# Patient Record
Sex: FEMALE | Race: WHITE | HISPANIC OR LATINO | URBAN - METROPOLITAN AREA
[De-identification: names, ages, dates, MRNs, and addresses within clinical notes are randomized per-mention and may not be internally consistent; named-entity substitution may affect disease eponyms.]

---

## 2020-06-05 ENCOUNTER — INPATIENT (INPATIENT)
Facility: HOSPITAL | Age: 50
LOS: 12 days | Discharge: HOME HEALTH | DRG: 949 | End: 2020-06-18
Attending: PHYSICAL MEDICINE & REHABILITATION | Admitting: PHYSICAL MEDICINE & REHABILITATION
Payer: COMMERCIAL

## 2020-06-05 VITALS
RESPIRATION RATE: 16 BRPM | HEART RATE: 107 BPM | DIASTOLIC BLOOD PRESSURE: 67 MMHG | TEMPERATURE: 98 F | WEIGHT: 118.17 LBS | OXYGEN SATURATION: 100 % | SYSTOLIC BLOOD PRESSURE: 103 MMHG | HEIGHT: 61 IN

## 2020-06-05 DIAGNOSIS — R53.81 OTHER MALAISE: ICD-10-CM

## 2020-06-05 PROCEDURE — 99222 1ST HOSP IP/OBS MODERATE 55: CPT

## 2020-06-05 RX ORDER — SENNA PLUS 8.6 MG/1
2 TABLET ORAL AT BEDTIME
Refills: 0 | Status: DISCONTINUED | OUTPATIENT
Start: 2020-06-05 | End: 2020-06-18

## 2020-06-05 RX ORDER — ACETAMINOPHEN 500 MG
650 TABLET ORAL EVERY 6 HOURS
Refills: 0 | Status: DISCONTINUED | OUTPATIENT
Start: 2020-06-05 | End: 2020-06-18

## 2020-06-05 RX ORDER — APIXABAN 2.5 MG/1
5 TABLET, FILM COATED ORAL
Refills: 0 | Status: DISCONTINUED | OUTPATIENT
Start: 2020-06-05 | End: 2020-06-09

## 2020-06-05 RX ORDER — SOD,AMMONIUM,POTASSIUM LACTATE
1 CREAM (GRAM) TOPICAL
Refills: 0 | Status: DISCONTINUED | OUTPATIENT
Start: 2020-06-05 | End: 2020-06-18

## 2020-06-05 RX ORDER — CALAMINE AND ZINC OXIDE AND PHENOL 160; 10 MG/ML; MG/ML
1 LOTION TOPICAL AT BEDTIME
Refills: 0 | Status: DISCONTINUED | OUTPATIENT
Start: 2020-06-05 | End: 2020-06-05

## 2020-06-05 RX ORDER — ZINC OXIDE 200 MG/G
1 OINTMENT TOPICAL
Refills: 0 | Status: DISCONTINUED | OUTPATIENT
Start: 2020-06-05 | End: 2020-06-05

## 2020-06-05 RX ORDER — ALBUTEROL 90 UG/1
1 AEROSOL, METERED ORAL EVERY 6 HOURS
Refills: 0 | Status: DISCONTINUED | OUTPATIENT
Start: 2020-06-05 | End: 2020-06-08

## 2020-06-05 RX ORDER — PANTOPRAZOLE SODIUM 20 MG/1
40 TABLET, DELAYED RELEASE ORAL
Refills: 0 | Status: DISCONTINUED | OUTPATIENT
Start: 2020-06-05 | End: 2020-06-09

## 2020-06-05 RX ORDER — CALCIUM CARBONATE 500(1250)
1 TABLET ORAL DAILY
Refills: 0 | Status: DISCONTINUED | OUTPATIENT
Start: 2020-06-05 | End: 2020-06-18

## 2020-06-05 RX ORDER — ATORVASTATIN CALCIUM 80 MG/1
20 TABLET, FILM COATED ORAL AT BEDTIME
Refills: 0 | Status: DISCONTINUED | OUTPATIENT
Start: 2020-06-05 | End: 2020-06-18

## 2020-06-05 RX ORDER — COLLAGENASE CLOSTRIDIUM HIST. 250 UNIT/G
1 OINTMENT (GRAM) TOPICAL
Refills: 0 | Status: DISCONTINUED | OUTPATIENT
Start: 2020-06-05 | End: 2020-06-08

## 2020-06-05 NOTE — H&P ADULT - NSHPSOCIALHISTORY_GEN_ALL_CORE
Social History  Denies smoking history, alcohol use or other drug use    Functional History:  Independent prior to COVID admission in ADLs and functional mobility  Lives with daughter in 4th floor walk-up apartment; has RW at home, daughter was providing wound care    Current Functional Status:  with PT on 6/4  Sit/stand Close S with RW  Bed/Chair CG w RW  Ambulation: 20' x 4 with RW and CG

## 2020-06-05 NOTE — H&P ADULT - NSHPPHYSICALEXAM_GEN_ALL_CORE
ICU Vital Signs Last 24 Hrs  T(C): --  T(F): --  HR: --  BP: --  BP(mean): --  ABP: --  ABP(mean): --  RR: --  SpO2: --    ----------------------------------------------------------------------------------------  PHYSICAL EXAM  Constitutional - NAD, Comfortable  HEENT - NCAT, EOMI  Neck - Supple, No limited ROM  Chest - Breathing comfortably, No wheezing  Cardiovascular - S1S2   Abdomen - Soft   Extremities - No C/C/E, No calf tenderness   Neurologic Exam -                    Cognitive - Awake, Alert, AAO to self, place, date, year, situation     Communication - Fluent, No dysarthria     Cranial Nerves - CN 2-12 intact     Motor - No focal deficits                    LEFT    UE - ShAB 5/5, EF 5/5, EE 5/5, WE 5/5,  5/5                    RIGHT UE - ShAB 5/5, EF 5/5, EE 5/5, WE 5/5,  5/5                    LEFT    LE - HF 5/5, KE 5/5, DF 5/5, PF 5/5                    RIGHT LE - HF 5/5, KE 5/5, DF 5/5, PF 5/5        Sensory - Intact to LT     Reflexes - DTR Intact, No primitive reflexive     Coordination - FTN intact     OculoVestibular - No saccades, No nystagmus, VOR         Balance - WNL Static  Psychiatric - Mood stable, Affect WNL Vital Signs Last 24 Hrs  T(C): 36.9 (05 Jun 2020 23:08), Max: 36.9 (05 Jun 2020 23:08)  T(F): 98.5 (05 Jun 2020 23:08), Max: 98.5 (05 Jun 2020 23:08)  HR: 107 (05 Jun 2020 23:08) (107 - 107)  BP: 103/67 (05 Jun 2020 23:08) (103/67 - 103/67)  BP(mean): --  RR: 16 (05 Jun 2020 23:08) (16 - 16)  SpO2: 100% (05 Jun 2020 23:08) (100% - 100%)  ----------------------------------------------------------------------------------------  PHYSICAL EXAM  Constitutional - NAD, Comfortable  HEENT - NCAT, EOMI  Neck - Supple, No limited ROM  Chest - Breathing comfortably, No wheezing  Cardiovascular - S1S2   Abdomen - Soft; +tender to palpation upper abdomen  Extremities - No C/C/E, No calf tenderness; dry skin  Neurologic Exam -                    Cognitive - Awake, Alert, AAO to self, place, date, year, situation     Communication - Fluent, No dysarthria     Cranial Nerves - CN 2-12 intact     Motor - No focal deficits                    LEFT    UE - ShAB 5/5, EF 4/5, EE 5/5, WE 5/5,  4/5                    RIGHT UE - ShAB 5/5, EF 4/5, EE 5/5, WE 5/5,  4/5                    LEFT    LE - HF 5/5, KE 5/5, DF 5/5, PF 5/5                    RIGHT LE - HF 5/5, KE 5/5, DF 5/5, PF 5/5        Sensory - Intact to LT     Coordination - FTN intact and coordinated movements of fingers intact bilaterally     OculoVestibular - no nystagmus  Psychiatric - Mood stable, Affect WNL  Skin - Left buttock 1x 0.5 cm pale wound bed with serosanguinous drainage;           Right buttock 7x2 cm full thickness wound; minimal slough Vital Signs Last 24 Hrs  T(C): 36.9 (05 Jun 2020 23:08), Max: 36.9 (05 Jun 2020 23:08)  T(F): 98.5 (05 Jun 2020 23:08), Max: 98.5 (05 Jun 2020 23:08)  HR: 107 (05 Jun 2020 23:08) (107 - 107)  BP: 103/67 (05 Jun 2020 23:08) (103/67 - 103/67)  BP(mean): --  RR: 16 (05 Jun 2020 23:08) (16 - 16)  SpO2: 100% (05 Jun 2020 23:08) (100% - 100%)  ----------------------------------------------------------------------------------------  PHYSICAL EXAM  Constitutional - NAD, Comfortable  HEENT - NCAT, EOMI  Neck - Supple, No limited ROM  Chest - Breathing comfortably, No wheezing  Cardiovascular - S1S2   Abdomen - Soft; +tender to palpation upper abdomen  Extremities - No C/C/E, No calf tenderness; dry skin  Neurologic Exam -                    Cognitive - Awake, Alert, AAO to self, place, date, year, situation     Communication - Fluent, No dysarthria     Cranial Nerves - CN 2-12 intact     Motor - No focal deficits                    LEFT    UE - ShAB 5/5, EF 4/5, EE 5/5, WE 5/5,  4/5                    RIGHT UE - ShAB 5/5, EF 4/5, EE 5/5, WE 5/5,  4/5                    LEFT    LE - HF 5/5, KE 5/5, DF 5/5, PF 5/5                    RIGHT LE - HF 5/5, KE 5/5, DF 5/5, PF 5/5        Sensory - Intact to LT     Coordination - FTN intact and coordinated movements of fingers intact bilaterally     OculoVestibular - no nystagmus  Psychiatric - Mood stable, Affect WNL  Skin - there is a full thickness wound to the sacral wound measuring 6x4cm with undermining from 12 to 4. Depth of about 2.5cm. Pale pink tissue to wound base. Some slough noted to wound base about 50%. No palpable bone. Periwound intact. No erythema. No odor noted.   No wounds or skin breakdown noted to heels, elbows, back, shoulders. No rashes noted.

## 2020-06-05 NOTE — H&P ADULT - HISTORY OF PRESENT ILLNESS
AYLIN PAREDES is a 50 year old woman with no significant PMH who had recent admission for COVID respiratory failure s/p intubation and trach to vent, was on ventilator for 30 days and discharged to [facility] on 5/11/20 and then went home after one day of [facility] on Eliquis 5mg bid. Patient then presented to [facility] on 5/25/20 with chest tightness and left arm numbness, fevers, nausea, abdominal discomfort, and urinary discomfort. Pulmonary embolus ruled out with CT chest. Patient also with stage 4 buttocks pressure injury. Gi consulted for dysphagia/pain with swallowing that GI attributed to recent decannulation, placed on puree/soft diet. Tachycardia during admission attributed to possible poor fluid intake. Pleuritic chest pain noted to improve. Tested COVID negative on 5/25 AYLIN PAREDES is a 50 year old woman with no significant PMH who had recent admission for COVID respiratory failure s/p intubation and trach to vent, was on ventilator for 30 days and discharged to [facility] on 5/11/20 and then went home after one day of [facility] on Eliquis 5mg bid. Patient then presented to [facility] on 5/25/20 with chest tightness and left arm numbness, fevers, nausea, abdominal discomfort, and urinary discomfort. Patient had chest tightness, tachycardia, elevated troponin likely secondary to pleurisy from recent COVID infection. ACS, myocarditis/pericarditis unlikely, seen by Cardiology. Pulmonary embolus ruled out with CT chest. Patient also with stage 4 buttocks pressure injury. Gi consulted for dysphagia/pain with swallowing that GI attributed to recent decannulation, placed on puree/soft diet. Tachycardia during admission attributed to possible poor fluid intake. Pleuritic chest pain noted to improve. Completed course of ceftriaxone for UTI. Tested COVID negative on 5/25

## 2020-06-05 NOTE — H&P ADULT - NSHPLABSRESULTS_GEN_ALL_CORE
6/5/20  Hgb/Hct:  7.8/24.4  WBC: 12.25  Plt: 650    Na/K: 135/3.7  BUN/Cr: 5.3/0.68      TTE 5/26  EF 60-65%    CT Chest 5/26: No definite evidence of pulmonary embolic disease; nonspecific bilateral groundglass and reticulonodular opacities and areas of consolidation which could be seen in atypical viral infection

## 2020-06-05 NOTE — H&P ADULT - ASSESSMENT
ASSESSMENT/PLAN  50/F with no significant PMH hospitalized with COVID s/p intubation, trach and decannulation with Gait Instability, ADL impairments and Functional impairments.    COMORBIDITES/ACTIVE MEDICAL ISSUES   #Post-COVID debility  -start Comprehensive Rehab Program of PT/OT/SLP  -Eliquis 5mg bid for post COVID ppx  -albuterol inhaler prn     #HLD?  -c/w statin    Pain Mgmt - Tylenol PRN  GI/Bowel Mgmt -  Continent c/w Senna prn; dulcolax supp prn; c/w pantoprazole  /Bladder Mgmt - Continent, PVR    FEN   - Diet - Puree/Soft   - Dysphagia  SLP - evaluation and treatment    Precautions / PROPHYLAXIS:   - Falls, Cardiac, Sternal, Spinal, Seizure   - ortho: Weight bearing status: WBAT   - Lungs: Aspiration, Incentive Spirometer   - Pressure injury/Skin: Turn Q2hrs while in bed, OOB to Chair, PT/OT    - DVT: Lovenox, SCDs, TEDs     MEDICAL PROGNOSIS: GOOD            REHAB POTENTIAL: GOOD             ESTIMATED DISPOSITION: HOME WITH HOME CARE            ELOS: 10-14 Days   EXPECTED THERAPY:     P.T. 1hr/day       O.T. 1hr/day      S.L.P. 1hr/day     P&O Unnecessary     EXP FREQUENCY: 5 days per 7 day period     PRESCREEN COMPARISION:   I have reviewed the prescreen information and I have found no relevant changes between the preadmission screening and my post admission evaluation     RATIONALE FOR INPATIENT ADMISSION - Patient demonstrates the following: (check all that apply)  [X] Medically appropriate for rehabilitation admission  [X] Has attainable rehab goals with an appropriate initial discharge plan  [X] Has rehabilitation potential (expected to make a significant improvement within a reasonable period of time)   [X] Requires close medical management by a rehab physician, rehab nursing care, Hospitalist and comprehensive interdisciplinary team (including PT, OT, & or SLP, Prosthetics and Orthotics) ASSESSMENT/PLAN  50/F with no significant PMH hospitalized with COVID s/p intubation, trach and decannulation with Gait Instability, ADL impairments and Functional impairments.    COMORBIDITES/ACTIVE MEDICAL ISSUES   #Post-COVID debility  -start Comprehensive Rehab Program of PT/OT/SLP  -Eliquis 5mg bid for post COVID ppx  -albuterol inhaler prn     #HLD?  -c/w statin    #Pressure Ulcers- Buttocks  -Left buttock stage 2- barrier cream and allevyn  -Right buttock stage 4- aquacell packing and allevyn dressing  -Wound care consult    Pain Mgmt - Tylenol PRN  GI/Bowel Mgmt -  Continent c/w Senna prn; dulcolax supp prn; c/w pantoprazole  /Bladder Mgmt - Continent, PVR    FEN   - Diet - Puree/Soft   - Dysphagia  SLP - evaluation and treatment    Precautions / PROPHYLAXIS:   - Falls, Cardiac, Sternal, Spinal, Seizure   - ortho: Weight bearing status: WBAT   - Lungs: Aspiration, Incentive Spirometer   - Pressure injury/Skin: Turn Q2hrs while in bed, OOB to Chair, PT/OT    - DVT: Lovenox, SCDs, TEDs     MEDICAL PROGNOSIS: GOOD            REHAB POTENTIAL: GOOD             ESTIMATED DISPOSITION: HOME WITH HOME CARE            ELOS: 10-14 Days   EXPECTED THERAPY:     P.T. 1hr/day       O.T. 1hr/day      S.L.P. 1hr/day     P&O Unnecessary     EXP FREQUENCY: 5 days per 7 day period     PRESCREEN COMPARISION:   I have reviewed the prescreen information and I have found no relevant changes between the preadmission screening and my post admission evaluation     RATIONALE FOR INPATIENT ADMISSION - Patient demonstrates the following: (check all that apply)  [X] Medically appropriate for rehabilitation admission  [X] Has attainable rehab goals with an appropriate initial discharge plan  [X] Has rehabilitation potential (expected to make a significant improvement within a reasonable period of time)   [X] Requires close medical management by a rehab physician, rehab nursing care, Hospitalist and comprehensive interdisciplinary team (including PT, OT, & or SLP, Prosthetics and Orthotics) ASSESSMENT/PLAN  50/F with no significant PMH hospitalized with COVID s/p intubation, trach and decannulation with Gait Instability, ADL impairments and Functional impairments.    COMORBIDITES/ACTIVE MEDICAL ISSUES   #Post-COVID debility  -start Comprehensive Rehab Program of PT/OT/SLP  -Eliquis 5mg bid for post COVID ppx  -albuterol inhaler prn     #HLD?  -c/w statin    Healing stage 4 pressure ulcer to sacral region   Per chart, wound is staged as 4.   Will start BID dressing changes, wound gel to base and wet to dry dressing, loosely packed. Monitor signs of healing, may benefit from WVAC.     Add MVI, vit c BID, and zinc x 9 days (limit use of zinc to avoid copper deficiency).  Roho cushion while in wc.   Turn and reposition at minimum of q2hrs to offload pressure.   Continue sol and ensure.        Pain Mgmt - Tylenol PRN  GI/Bowel Mgmt -  Continent c/w Senna prn; dulcolax supp prn; c/w pantoprazole  /Bladder Mgmt - Continent, PVR    FEN   - Diet - Puree/Soft   - Dysphagia  SLP - evaluation and treatment    Precautions / PROPHYLAXIS:   - Falls, Cardiac, Sternal, Spinal, Seizure   - ortho: Weight bearing status: WBAT   - Lungs: Aspiration, Incentive Spirometer   - Pressure injury/Skin: Turn Q2hrs while in bed, OOB to Chair, PT/OT    - DVT: Lovenox, SCDs, TEDs     MEDICAL PROGNOSIS: GOOD            REHAB POTENTIAL: GOOD             ESTIMATED DISPOSITION: HOME WITH HOME CARE            ELOS: 10-14 Days   EXPECTED THERAPY:     P.T. 1hr/day       O.T. 1hr/day      S.L.P. 1hr/day     P&O Unnecessary     EXP FREQUENCY: 5 days per 7 day period     PRESCREEN COMPARISION:   I have reviewed the prescreen information and I have found no relevant changes between the preadmission screening and my post admission evaluation     RATIONALE FOR INPATIENT ADMISSION - Patient demonstrates the following: (check all that apply)  [X] Medically appropriate for rehabilitation admission  [X] Has attainable rehab goals with an appropriate initial discharge plan  [X] Has rehabilitation potential (expected to make a significant improvement within a reasonable period of time)   [X] Requires close medical management by a rehab physician, rehab nursing care, Hospitalist and comprehensive interdisciplinary team (including PT, OT, & or SLP, Prosthetics and Orthotics)

## 2020-06-05 NOTE — H&P ADULT - NSHPREVIEWOFSYSTEMS_GEN_ALL_CORE
REVIEW OF SYSTEMS  Constitutional: No fever, No Chills, No fatigue  HEENT: No eye pain, No visual disturbances, No difficulty hearing  Pulm: No cough,  No shortness of breath  Cardio: No chest pain, No palpitations  GI:  No abdominal pain, No nausea, No vomiting, No diarrhea, No constipation  : No dysuria, No frequency, No hematuria  Neuro: No headaches, No memory loss, No loss of strength, No numbness, No tremors  Skin: No itching, No rashes, No lesions   Endo: No temperature intolerance  MSK: No joint pain, No joint swelling, No muscle pain, No Neck or back pain  Psych:  No depression, No anxiety REVIEW OF SYSTEMS  Constitutional: No fever, No Chills, No fatigue  HEENT: No eye pain, No visual disturbances, No difficulty hearing  Pulm: + cough with movement,  +dyspnea with exertion  Cardio: No chest pain, No palpitations  GI:  +Gas frequent, + heartburn, +abdominal pain, No nausea, No vomiting, No diarrhea, No constipation  : No dysuria, No frequency, No hematuria  Neuro: No headaches, No memory loss, No loss of strength, No numbness, No tremors  Skin: No itching, No rashes, + lesions ; +dry skin  Endo: No temperature intolerance  MSK: No joint pain, No joint swelling, No muscle pain, No Neck or back pain  Psych:  No depression, No anxiety

## 2020-06-06 LAB
ALBUMIN SERPL ELPH-MCNC: 2.9 G/DL — LOW (ref 3.3–5)
ALP SERPL-CCNC: 99 U/L — SIGNIFICANT CHANGE UP (ref 40–120)
ALT FLD-CCNC: 24 U/L — SIGNIFICANT CHANGE UP (ref 10–45)
ANION GAP SERPL CALC-SCNC: 9 MMOL/L — SIGNIFICANT CHANGE UP (ref 5–17)
AST SERPL-CCNC: 17 U/L — SIGNIFICANT CHANGE UP (ref 10–40)
BASOPHILS # BLD AUTO: 0.08 K/UL — SIGNIFICANT CHANGE UP (ref 0–0.2)
BASOPHILS NFR BLD AUTO: 0.7 % — SIGNIFICANT CHANGE UP (ref 0–2)
BILIRUB SERPL-MCNC: 0.6 MG/DL — SIGNIFICANT CHANGE UP (ref 0.2–1.2)
BUN SERPL-MCNC: 5 MG/DL — LOW (ref 7–23)
CALCIUM SERPL-MCNC: 8.6 MG/DL — SIGNIFICANT CHANGE UP (ref 8.4–10.5)
CHLORIDE SERPL-SCNC: 101 MMOL/L — SIGNIFICANT CHANGE UP (ref 96–108)
CO2 SERPL-SCNC: 27 MMOL/L — SIGNIFICANT CHANGE UP (ref 22–31)
CREAT SERPL-MCNC: 0.82 MG/DL — SIGNIFICANT CHANGE UP (ref 0.5–1.3)
CRP SERPL-MCNC: 0.12 MG/DL — SIGNIFICANT CHANGE UP (ref 0–0.4)
D DIMER BLD IA.RAPID-MCNC: <150 NG/ML DDU — SIGNIFICANT CHANGE UP
EOSINOPHIL # BLD AUTO: 0.18 K/UL — SIGNIFICANT CHANGE UP (ref 0–0.5)
EOSINOPHIL NFR BLD AUTO: 1.5 % — SIGNIFICANT CHANGE UP (ref 0–6)
FERRITIN SERPL-MCNC: 1246 NG/ML — HIGH (ref 15–150)
GLUCOSE SERPL-MCNC: 97 MG/DL — SIGNIFICANT CHANGE UP (ref 70–99)
HCT VFR BLD CALC: 25 % — LOW (ref 34.5–45)
HGB BLD-MCNC: 8.3 G/DL — LOW (ref 11.5–15.5)
IMM GRANULOCYTES NFR BLD AUTO: 1.9 % — HIGH (ref 0–1.5)
LYMPHOCYTES # BLD AUTO: 5.94 K/UL — HIGH (ref 1–3.3)
LYMPHOCYTES # BLD AUTO: 50.4 % — HIGH (ref 13–44)
MCHC RBC-ENTMCNC: 30.9 PG — SIGNIFICANT CHANGE UP (ref 27–34)
MCHC RBC-ENTMCNC: 33.2 GM/DL — SIGNIFICANT CHANGE UP (ref 32–36)
MCV RBC AUTO: 92.9 FL — SIGNIFICANT CHANGE UP (ref 80–100)
MONOCYTES # BLD AUTO: 0.67 K/UL — SIGNIFICANT CHANGE UP (ref 0–0.9)
MONOCYTES NFR BLD AUTO: 5.7 % — SIGNIFICANT CHANGE UP (ref 2–14)
NEUTROPHILS # BLD AUTO: 4.69 K/UL — SIGNIFICANT CHANGE UP (ref 1.8–7.4)
NEUTROPHILS NFR BLD AUTO: 39.8 % — LOW (ref 43–77)
NRBC # BLD: 0 /100 WBCS — SIGNIFICANT CHANGE UP (ref 0–0)
PLATELET # BLD AUTO: 616 K/UL — HIGH (ref 150–400)
POTASSIUM SERPL-MCNC: 3.5 MMOL/L — SIGNIFICANT CHANGE UP (ref 3.5–5.3)
POTASSIUM SERPL-SCNC: 3.5 MMOL/L — SIGNIFICANT CHANGE UP (ref 3.5–5.3)
PROT SERPL-MCNC: 6.3 G/DL — SIGNIFICANT CHANGE UP (ref 6–8.3)
RBC # BLD: 2.69 M/UL — LOW (ref 3.8–5.2)
RBC # FLD: 15.9 % — HIGH (ref 10.3–14.5)
SODIUM SERPL-SCNC: 137 MMOL/L — SIGNIFICANT CHANGE UP (ref 135–145)
WBC # BLD: 11.78 K/UL — HIGH (ref 3.8–10.5)
WBC # FLD AUTO: 11.78 K/UL — HIGH (ref 3.8–10.5)

## 2020-06-06 PROCEDURE — 99254 IP/OBS CNSLTJ NEW/EST MOD 60: CPT

## 2020-06-06 PROCEDURE — 93010 ELECTROCARDIOGRAM REPORT: CPT

## 2020-06-06 PROCEDURE — 99232 SBSQ HOSP IP/OBS MODERATE 35: CPT

## 2020-06-06 RX ORDER — SIMETHICONE 80 MG/1
80 TABLET, CHEWABLE ORAL THREE TIMES A DAY
Refills: 0 | Status: DISCONTINUED | OUTPATIENT
Start: 2020-06-06 | End: 2020-06-07

## 2020-06-06 RX ORDER — SODIUM CHLORIDE 9 MG/ML
1000 INJECTION INTRAMUSCULAR; INTRAVENOUS; SUBCUTANEOUS
Refills: 0 | Status: COMPLETED | OUTPATIENT
Start: 2020-06-06 | End: 2020-06-06

## 2020-06-06 RX ADMIN — SODIUM CHLORIDE 75 MILLILITER(S): 9 INJECTION INTRAMUSCULAR; INTRAVENOUS; SUBCUTANEOUS at 13:20

## 2020-06-06 RX ADMIN — ATORVASTATIN CALCIUM 20 MILLIGRAM(S): 80 TABLET, FILM COATED ORAL at 21:13

## 2020-06-06 RX ADMIN — PANTOPRAZOLE SODIUM 40 MILLIGRAM(S): 20 TABLET, DELAYED RELEASE ORAL at 05:31

## 2020-06-06 RX ADMIN — ALBUTEROL 1 PUFF(S): 90 AEROSOL, METERED ORAL at 16:07

## 2020-06-06 RX ADMIN — APIXABAN 5 MILLIGRAM(S): 2.5 TABLET, FILM COATED ORAL at 05:31

## 2020-06-06 RX ADMIN — ALBUTEROL 1 PUFF(S): 90 AEROSOL, METERED ORAL at 09:47

## 2020-06-06 RX ADMIN — APIXABAN 5 MILLIGRAM(S): 2.5 TABLET, FILM COATED ORAL at 17:41

## 2020-06-06 RX ADMIN — ALBUTEROL 1 PUFF(S): 90 AEROSOL, METERED ORAL at 22:08

## 2020-06-06 RX ADMIN — Medication 1 APPLICATION(S): at 17:08

## 2020-06-06 RX ADMIN — Medication 1 TABLET(S): at 13:20

## 2020-06-06 RX ADMIN — Medication 1 APPLICATION(S): at 05:31

## 2020-06-06 NOTE — CHART NOTE - NSCHARTNOTEFT_GEN_A_CORE
Upon Nutritional Assessment by the Registered Dietitian Your Patient was Determined to Meet criteria/has Evidence of the Following Diagnosis:          [X]  Acute Severe Protein-Calorie Malnutrition    Findings as based on:  [X] Comprehensive Nutrition Assessment   [X] Nutrition Focused Physical Exam - Temporalis, Orbital Fat Pads, Buccal Fat Pads, Clavicle, Quadricep & Gastrocnemius(Calf) Wasting/Depletion Observed  [X] Other: Poor PO Intake 5+ Days & Significant Weight Decrease Over Last Month    Nutrition Plan/Recommendations:    1) Ensure Clear 8oz PO BID (Provides 480kcal & 16grams of Protein)  2) Darrion 1 Packet BID (Provides 180kcal & 28grams of Protein)     PROVIDER Section:   By Signing This Assessment You Are Acknowledging & Agree with The Diagnosis Assigned by the Registered Dietitian    Clarence Bañuelos RDN

## 2020-06-06 NOTE — DIETITIAN INITIAL EVALUATION ADULT. - SIGNS/SYMPTOMS
Poor PO Intake, Significant Weight Decrease & Fat Depletion/Muscle Wasting Observed Need for Soft (Dysphagia 3) Diet

## 2020-06-06 NOTE — DIETITIAN INITIAL EVALUATION ADULT. - ENERGY NEEDS
Height: 61Inches   Weight: 118lb   Body Mass Index (BMI): 22.3kg/m2   Ideal Body Weight Range: 105lb (+/-10%)   Percent Ideal Body Weight ~112%

## 2020-06-06 NOTE — DIETITIAN INITIAL EVALUATION ADULT. - ADD RECOMMEND
1) Monitor Weights, Intake, Tolerance, Skin & Labwork   2) Ensure Clear 8oz PO BID 3) Darrion 1 Packet BID 4) Education Provided on Need for Supplementation 5) Continue Nutrition Plan of Care

## 2020-06-06 NOTE — DIETITIAN INITIAL EVALUATION ADULT. - DIET TYPE
on Soft (Dysphagia 3) Diet w/ Thin Liquids   Recommend Initiate Ensure Clear 8oz PO BID (Declines Ensure Enlive) & Darrion 1 Packet BID  Education Provided on Need for Supplementation   Patient Does Not Follow Diet @Home, Consumes 3Meals a Day & Doesn't Take Vitamin/Supplements @Home dysphagia 3, soft, thin liquids/supplement (specify)

## 2020-06-06 NOTE — PROGRESS NOTE ADULT - SUBJECTIVE AND OBJECTIVE BOX
Patient is a 50y old  Female who presents with a chief complaint of COVID Debility (06 Jun 2020 11:09)      HPI:  AYLIN PAREDES is a 50 year old woman with no significant PMH who had recent admission for COVID respiratory failure s/p intubation and trach to vent, was on ventilator for 30 days and discharged to [facility] on 5/11/20 and then went home after one day of [facility] on Eliquis 5mg bid. Patient then presented to [facility] on 5/25/20 with chest tightness and left arm numbness, fevers, nausea, abdominal discomfort, and urinary discomfort. Patient had chest tightness, tachycardia, elevated troponin likely secondary to pleurisy from recent COVID infection. ACS, myocarditis/pericarditis unlikely, seen by Cardiology. Pulmonary embolus ruled out with CT chest. Patient also with stage 4 buttocks pressure injury. Gi consulted for dysphagia/pain with swallowing that GI attributed to recent decannulation, placed on puree/soft diet. Tachycardia during admission attributed to possible poor fluid intake. Pleuritic chest pain noted to improve. Completed course of ceftriaxone for UTI. Tested COVID negative on 5/25 (05 Jun 2020 15:41)      TODAY'S SUBJECTIVE & REVIEW OF SYMPTOMS: Patient seen and evaluated this morning. No acute events overnight. Participating well in therapy. Pain is well controlled. Denies chest pain, SOB, nausea, vomiting, constipation or diarrhea. Slept well last night.       [X] Constiutional WNL        [X] Cardio WNL              [X] Resp WNL  [X] GI WNL                            [X]  WNL                    [X] Heme WNL  [X] Endo WNL                       [X] Skin WNL                  [X] MSK WNL  [X] Neuro WNL                     [X] Cognitive WNL         [X] Psych WNL      PHYSICAL EXAM    Vital Signs Last 24 Hrs  T(C): 36.8 (06 Jun 2020 09:03), Max: 36.9 (05 Jun 2020 23:08)  T(F): 98.2 (06 Jun 2020 09:03), Max: 98.5 (05 Jun 2020 23:08)  HR: 104 (06 Jun 2020 16:08) (98 - 127)  BP: 71/47 (06 Jun 2020 10:01) (71/47 - 103/67)  BP(mean): --  RR: 18 (06 Jun 2020 09:03) (16 - 18)  SpO2: 97% (06 Jun 2020 16:08) (97% - 100%)    Constitutional - NAD, Comfortable  	HEENT - NCAT, EOMI  	Neck - Supple, No limited ROM  	Chest - Breathing comfortably, No wheezing  	Cardiovascular - S1S2   	Abdomen - Soft; +tender to palpation upper abdomen  	Extremities - No C/C/E, No calf tenderness; dry skin  	Neurologic Exam -                 	   Cognitive - Awake, Alert, AAO to self, place, date, year, situation  	   Communication - Fluent, No dysarthria  	   Cranial Nerves - CN 2-12 intact  	   Motor - No focal deficits  	                  LEFT    UE - ShAB 5/5, EF 4/5, EE 5/5, WE 5/5,  4/5  	                  RIGHT UE - ShAB 5/5, EF 4/5, EE 5/5, WE 5/5,  4/5  	                  LEFT    LE - HF 5/5, KE 5/5, DF 5/5, PF 5/5  	                  RIGHT LE - HF 5/5, KE 5/5, DF 5/5, PF 5/5     	   Sensory - Intact to LT  	   Coordination - FTN intact and coordinated movements of fingers intact bilaterally  	   OculoVestibular - no nystagmus  	Psychiatric - Mood stable, Affect WNL  	Skin - Left buttock 1x 0.5 cm pale wound bed with serosanguinous drainage;           Right buttock 7x2 cm full thickness wound; minimal slough  RECENT LABS/IMAGING                        8.3    11.78 )-----------( 616      ( 06 Jun 2020 07:30 )             25.0     06-06    137  |  101  |  5<L>  ----------------------------<  97  3.5   |  27  |  0.82    Ca    8.6      06 Jun 2020 07:30    TPro  6.3  /  Alb  2.9<L>  /  TBili  0.6  /  DBili  x   /  AST  17  /  ALT  24  /  AlkPhos  99  06-06     ASSESSMENT/PLAN  50/F with no significant PMH hospitalized with COVID s/p intubation, trach and decannulation with Gait Instability, ADL impairments and Functional impairments.    COMORBIDITES/ACTIVE MEDICAL ISSUES   #Post-COVID debility  -start Comprehensive Rehab Program of PT/OT/SLP  -Eliquis 5mg bid for post COVID ppx  -albuterol inhaler prn     #HLD?  -c/w statin    #Pressure Ulcers- Buttocks  -Left buttock stage 2- barrier cream and allevyn  -Right buttock stage 4- aquacell packing and allevyn dressing  -Wound care consult    Pain Mgmt - Tylenol PRN  GI/Bowel Mgmt -  Continent c/w Senna prn; dulcolax supp prn; c/w pantoprazole  /Bladder Mgmt - Continent, PVR    FEN   - Diet - Puree/Soft   - Dysphagia  SLP - evaluation and treatment    Precautions / PROPHYLAXIS:   - Falls, Cardiac, Sternal, Spinal, Seizure   - ortho: Weight bearing status: WBAT   - Lungs: Aspiration, Incentive Spirometer   - Pressure injury/Skin: Turn Q2hrs while in bed, OOB to Chair, PT/OT    - DVT: Lovenox, SCDs, TEDs

## 2020-06-06 NOTE — PROGRESS NOTE ADULT - ASSESSMENT
51 y/o woman with no significant PMH hospitalized with COVID s/p intubation, trach and decannulation, readmitted for chest tightness, and possibly pleuritis now admiited to [facility] for gait Instability, ADL impairments and Functional impairments.    Rheab: Functional and gait instability:  - C/w PT/OT per primary team .    CVS: Chest tightness, SOB, Tachycardia, HLD, pleuritis due to COVID infection  - Was seen by cardiology and likely pleuritis in setting of normal TTE  - Orthostatic? will give her gentle fluid hydration   - Pleuritis likley related to COVID infection  - will EKG and TTE here   - C/w Toprol 20 mg PO QD   - C/w Eliquis 5 g PO BID for now; given D-dimer it can be switched to Xarelto 10 mg PO QD   - In initial hospitalization she was Intubated, trach and then decannulated due to COVID infection     Pressure Ulcers- Buttocks  -Left buttock stage 2- barrier cream and allevyn  -Right buttock stage 4- aquacell packing and allevyn dressing  -Wound care consult    GI: Dysphagia   - C/w Protonix 40 mg PO QD     DVT PPX:   - C/w Eliquis 5 mg PO BID for now

## 2020-06-06 NOTE — DIETITIAN INITIAL EVALUATION ADULT. - OTHER INFO
50yr Old Female - Dx: Malaise - Initial Assessment - Diet - Soft (Dysphagia 3) Diet w/ Thin Liquids (Recommend Initiate Ensure Clear 8oz PO BID & Darrion 1 Packet BID), Denies Food Allergy/Intolerance (Prefers Lactaid Milk), Tolerates Diet Well, No Chewing/Swallowing Complications (Per Patient), Stage 4 Pressure Ulcer on Right Buttock & Stage 2 Pressure Ulcer on Left Buttock  (as Per Nursing Flow Sheets), No Edema Noted (as Per Nursing Flow Sheets), No Recent Vomiting/Diarrhea & Some Nausea/Constipation (as Per Patient)

## 2020-06-06 NOTE — PROGRESS NOTE ADULT - SUBJECTIVE AND OBJECTIVE BOX
Patient is a 50y old  Female who presents with a chief complaint of COVID Debility (05 Jun 2020 15:41)    HPI:  51 y/o woman with no significant PMH who had recent admission for COVID respiratory failure s/p intubation and trach to vent, was on ventilator for 30 days and discharged to [facility] on 5/11/20 and then went home after one day of [facility] on Eliquis 5mg bid. Patient then presented to [facility] on 5/25/20 with chest tightness and left arm numbness, fevers, nausea, abdominal discomfort, and urinary discomfort. Patient had chest tightness, tachycardia, elevated troponin likely secondary to pleurisy from recent COVID infection. ACS, myocarditis/pericarditis unlikely, seen by Cardiology. Pulmonary embolus ruled out with CT chest. Patient also with stage 4 buttocks pressure injury. Gi consulted for dysphagia/pain with swallowing that GI attributed to recent decannulation, placed on puree/soft diet. Tachycardia during admission attributed to possible poor fluid intake. Pleuritic chest pain noted to improve. Completed course of ceftriaxone for UTI. Tested COVID negative on 5/25 (05 Jun 2020 15:41)    PAST MEDICAL & SURGICAL HISTORY:  No pertinent past medical history  No significant past surgical history    Substance Use (street drugs): (  ) never used  (  ) other:  Tobacco Usage:  (   ) never smoked   (   ) former smoker   (   ) current smoker  (     ) pack year  Alcohol Usage:  Sexual History:   Recent Travel:      Allergies    No Known Allergies    Intolerances        ALBUTerol    90 MICROgram(s) HFA Inhaler 1 Puff(s) Inhalation every 6 hours  aluminum hydroxide/magnesium hydroxide/simethicone Suspension 30 milliLiter(s) Oral every 6 hours PRN  ammonium lactate 12% Lotion 1 Application(s) Topical two times a day  apixaban 5 milliGRAM(s) Oral two times a day  atorvastatin 20 milliGRAM(s) Oral at bedtime  calcium carbonate   1250 mG (OsCal) 1 Tablet(s) Oral daily  collagenase Ointment 1 Application(s) Topical two times a day  pantoprazole    Tablet 40 milliGRAM(s) Oral before breakfast      REVIEW OF SYSTEMS:  CONSTITUTIONAL: No fever, weight loss, or fatigue  EYES: No eye pain, visual disturbances, or discharge  ENMT:  No difficulty hearing, tinnitus, vertigo; No sinus or throat pain  NECK: No pain or stiffness  BREASTS: No pain, masses, or nipple discharge  RESPIRATORY: No cough, wheezing, chills or hemoptysis; No shortness of breath  CARDIOVASCULAR: No chest pain, palpitations, dizziness, or leg swelling  GASTROINTESTINAL: No abdominal or epigastric pain. No nausea, vomiting, or hematemesis; No diarrhea or constipation. No melena or hematochezia.  GENITOURINARY: No dysuria, frequency, hematuria, or incontinence  NEUROLOGICAL: No headaches, memory loss, loss of strength, numbness, or tremors  SKIN: No itching, burning, rashes, or lesions   LYMPH NODES: No enlarged glands  ENDOCRINE: No heat or cold intolerance; No hair loss  MUSCULOSKELETAL: No joint pain or swelling; No muscle, back, or extremity pain  PSYCHIATRIC: No depression, anxiety, mood swings, or difficulty sleeping  HEME/LYMPH: No easy bruising, or bleeding gums  ALLERY AND IMMUNOLOGIC: No hives or eczema    ALL ROS REVIEWED AND NORMAL EXCEPT AS STATED ABOVE    T(C): 36.8 (06-06-20 @ 09:03), Max: 36.9 (06-05-20 @ 23:08)  HR: 104 (06-06-20 @ 09:48) (101 - 127)  BP: 90/62 (06-06-20 @ 09:03) (90/62 - 103/67)  RR: 18 (06-06-20 @ 09:03) (16 - 18)  SpO2: 98% (06-06-20 @ 09:48) (98% - 100%)  Wt(kg): --Vital Signs Last 24 Hrs  T(C): 36.8 (06 Jun 2020 09:03), Max: 36.9 (05 Jun 2020 23:08)  T(F): 98.2 (06 Jun 2020 09:03), Max: 98.5 (05 Jun 2020 23:08)  HR: 104 (06 Jun 2020 09:48) (101 - 127)  BP: 90/62 (06 Jun 2020 09:03) (90/62 - 103/67)  BP(mean): --  RR: 18 (06 Jun 2020 09:03) (16 - 18)  SpO2: 98% (06 Jun 2020 09:48) (98% - 100%)    PHYSICAL EXAM:  Constitutional - NAD, Comfortable  HEENT - NCAT, EOMI  Neck - Supple, No limited ROM  Chest - Breathing comfortably, No wheezing  Cardiovascular - S1S2   Abdomen - Soft; +tender to palpation upper abdomen  Extremities - No C/C/E, No calf tenderness; dry skin    LABS:                        8.3    11.78 )-----------( 616      ( 06 Jun 2020 07:30 )             25.0     06-06    137  |  101  |  5<L>  ----------------------------<  97  3.5   |  27  |  0.82    Ca    8.6      06 Jun 2020 07:30    TPro  6.3  /  Alb  2.9<L>  /  TBili  0.6  /  DBili  x   /  AST  17  /  ALT  24  /  AlkPhos  99  06-06      RADIOLOGY & ADDITIONAL TESTS:    Consultant(s) Notes Reviewed:  [x ] YES  [ ] NO  Care Discussed with Consultants/Other Providers [ x] YES  [ ] NO  Imaging Personally Reviewed:  [ ] YES  [ ] NO This is a consult note    Patient is a 50y old  Female who presents with a chief complaint of COVID Debility (05 Jun 2020 15:41)    HPI:  49 y/o woman with no significant PMH who had recent admission for COVID respiratory failure s/p intubation and trach to vent, was on ventilator for 30 days and discharged to [facility] on 5/11/20 and then went home after one day of [facility] on Eliquis 5mg bid. Patient then presented to [facility] on 5/25/20 with chest tightness and left arm numbness, fevers, nausea, abdominal discomfort, and urinary discomfort. Patient had chest tightness, tachycardia, elevated troponin likely secondary to pleurisy from recent COVID infection. ACS, myocarditis/pericarditis unlikely, seen by Cardiology. Pulmonary embolus ruled out with CT chest. Patient also with stage 4 buttocks pressure injury. Gi consulted for dysphagia/pain with swallowing that GI attributed to recent decannulation, placed on puree/soft diet. Tachycardia during admission attributed to possible poor fluid intake. Pleuritic chest pain noted to improve. Completed course of ceftriaxone for UTI. Tested COVID negative on 5/25 (05 Jun 2020 15:41)    PAST MEDICAL & SURGICAL HISTORY:  No pertinent past medical history  No significant past surgical history    Substance Use (street drugs): (  ) never used  (  ) other:  Tobacco Usage:  (   ) never smoked   (   ) former smoker   (   ) current smoker  (     ) pack year  Alcohol Usage:  Sexual History:   Recent Travel:      Allergies    No Known Allergies    Intolerances        ALBUTerol    90 MICROgram(s) HFA Inhaler 1 Puff(s) Inhalation every 6 hours  aluminum hydroxide/magnesium hydroxide/simethicone Suspension 30 milliLiter(s) Oral every 6 hours PRN  ammonium lactate 12% Lotion 1 Application(s) Topical two times a day  apixaban 5 milliGRAM(s) Oral two times a day  atorvastatin 20 milliGRAM(s) Oral at bedtime  calcium carbonate   1250 mG (OsCal) 1 Tablet(s) Oral daily  collagenase Ointment 1 Application(s) Topical two times a day  pantoprazole    Tablet 40 milliGRAM(s) Oral before breakfast      REVIEW OF SYSTEMS:  CONSTITUTIONAL: No fever, weight loss, or fatigue  EYES: No eye pain, visual disturbances, or discharge  ENMT:  No difficulty hearing, tinnitus, vertigo; No sinus or throat pain  NECK: No pain or stiffness  BREASTS: No pain, masses, or nipple discharge  RESPIRATORY: No cough, wheezing, chills or hemoptysis; No shortness of breath  CARDIOVASCULAR: No chest pain, palpitations, dizziness, or leg swelling  GASTROINTESTINAL: No abdominal or epigastric pain. No nausea, vomiting, or hematemesis; No diarrhea or constipation. No melena or hematochezia.  GENITOURINARY: No dysuria, frequency, hematuria, or incontinence  NEUROLOGICAL: No headaches, memory loss, loss of strength, numbness, or tremors  SKIN: No itching, burning, rashes, or lesions   LYMPH NODES: No enlarged glands  ENDOCRINE: No heat or cold intolerance; No hair loss  MUSCULOSKELETAL: No joint pain or swelling; No muscle, back, or extremity pain  PSYCHIATRIC: No depression, anxiety, mood swings, or difficulty sleeping  HEME/LYMPH: No easy bruising, or bleeding gums  ALLERY AND IMMUNOLOGIC: No hives or eczema    ALL ROS REVIEWED AND NORMAL EXCEPT AS STATED ABOVE    T(C): 36.8 (06-06-20 @ 09:03), Max: 36.9 (06-05-20 @ 23:08)  HR: 104 (06-06-20 @ 09:48) (101 - 127)  BP: 90/62 (06-06-20 @ 09:03) (90/62 - 103/67)  RR: 18 (06-06-20 @ 09:03) (16 - 18)  SpO2: 98% (06-06-20 @ 09:48) (98% - 100%)  Wt(kg): --Vital Signs Last 24 Hrs  T(C): 36.8 (06 Jun 2020 09:03), Max: 36.9 (05 Jun 2020 23:08)  T(F): 98.2 (06 Jun 2020 09:03), Max: 98.5 (05 Jun 2020 23:08)  HR: 104 (06 Jun 2020 09:48) (101 - 127)  BP: 90/62 (06 Jun 2020 09:03) (90/62 - 103/67)  BP(mean): --  RR: 18 (06 Jun 2020 09:03) (16 - 18)  SpO2: 98% (06 Jun 2020 09:48) (98% - 100%)    PHYSICAL EXAM:  Constitutional - NAD, Comfortable  HEENT - NCAT, EOMI  Neck - Supple, No limited ROM  Chest - Breathing comfortably, No wheezing  Cardiovascular - S1S2   Abdomen - Soft; +tender to palpation upper abdomen  Extremities - No C/C/E, No calf tenderness; dry skin    LABS:                        8.3    11.78 )-----------( 616      ( 06 Jun 2020 07:30 )             25.0     06-06    137  |  101  |  5<L>  ----------------------------<  97  3.5   |  27  |  0.82    Ca    8.6      06 Jun 2020 07:30    TPro  6.3  /  Alb  2.9<L>  /  TBili  0.6  /  DBili  x   /  AST  17  /  ALT  24  /  AlkPhos  99  06-06      RADIOLOGY & ADDITIONAL TESTS:    Consultant(s) Notes Reviewed:  [x ] YES  [ ] NO  Care Discussed with Consultants/Other Providers [ x] YES  [ ] NO  Imaging Personally Reviewed:  [ ] YES  [ ] NO

## 2020-06-07 PROCEDURE — 99233 SBSQ HOSP IP/OBS HIGH 50: CPT

## 2020-06-07 PROCEDURE — 93307 TTE W/O DOPPLER COMPLETE: CPT | Mod: 26

## 2020-06-07 PROCEDURE — 99232 SBSQ HOSP IP/OBS MODERATE 35: CPT

## 2020-06-07 RX ORDER — SIMETHICONE 80 MG/1
80 TABLET, CHEWABLE ORAL THREE TIMES A DAY
Refills: 0 | Status: DISCONTINUED | OUTPATIENT
Start: 2020-06-07 | End: 2020-06-08

## 2020-06-07 RX ADMIN — APIXABAN 5 MILLIGRAM(S): 2.5 TABLET, FILM COATED ORAL at 06:26

## 2020-06-07 RX ADMIN — Medication 1 APPLICATION(S): at 08:41

## 2020-06-07 RX ADMIN — Medication 1 APPLICATION(S): at 06:25

## 2020-06-07 RX ADMIN — Medication 1 APPLICATION(S): at 16:39

## 2020-06-07 RX ADMIN — SIMETHICONE 80 MILLIGRAM(S): 80 TABLET, CHEWABLE ORAL at 21:20

## 2020-06-07 RX ADMIN — APIXABAN 5 MILLIGRAM(S): 2.5 TABLET, FILM COATED ORAL at 16:38

## 2020-06-07 RX ADMIN — ALBUTEROL 1 PUFF(S): 90 AEROSOL, METERED ORAL at 15:46

## 2020-06-07 RX ADMIN — Medication 1 TABLET(S): at 12:09

## 2020-06-07 RX ADMIN — ATORVASTATIN CALCIUM 20 MILLIGRAM(S): 80 TABLET, FILM COATED ORAL at 21:20

## 2020-06-07 RX ADMIN — ALBUTEROL 1 PUFF(S): 90 AEROSOL, METERED ORAL at 05:17

## 2020-06-07 RX ADMIN — ALBUTEROL 1 PUFF(S): 90 AEROSOL, METERED ORAL at 08:30

## 2020-06-07 RX ADMIN — ALBUTEROL 1 PUFF(S): 90 AEROSOL, METERED ORAL at 21:14

## 2020-06-07 RX ADMIN — PANTOPRAZOLE SODIUM 40 MILLIGRAM(S): 20 TABLET, DELAYED RELEASE ORAL at 06:26

## 2020-06-07 NOTE — PROGRESS NOTE ADULT - SUBJECTIVE AND OBJECTIVE BOX
Hospitalist: Johnny Carpenter DO    CHIEF COMPLAINT: Patient is a 50y old  female who presents with a chief complaint of COVID Debility (06 Jun 2020 20:10)    SUBJECTIVE / OVERNIGHT EVENTS: Patient seen and examined. No acute events overnight. Pain well controlled and patient without any complaints.    MEDICATIONS  (STANDING):  ALBUTerol    90 MICROgram(s) HFA Inhaler 1 Puff(s) Inhalation every 6 hours  ammonium lactate 12% Lotion 1 Application(s) Topical two times a day  apixaban 5 milliGRAM(s) Oral two times a day  atorvastatin 20 milliGRAM(s) Oral at bedtime  calcium carbonate   1250 mG (OsCal) 1 Tablet(s) Oral daily  collagenase Ointment 1 Application(s) Topical two times a day  pantoprazole    Tablet 40 milliGRAM(s) Oral before breakfast    MEDICATIONS  (PRN):  acetaminophen   Tablet .. 650 milliGRAM(s) Oral every 6 hours PRN Temp greater or equal to 38C (100.4F), Mild Pain (1 - 3)  aluminum hydroxide/magnesium hydroxide/simethicone Suspension 30 milliLiter(s) Oral every 6 hours PRN Dyspepsia  senna 2 Tablet(s) Oral at bedtime PRN Constipation  simethicone 80 milliGRAM(s) Chew three times a day PRN Indigestion      VITALS:  T(F): 98.8 (06-07-20 @ 07:37), Max: 98.8 (06-07-20 @ 07:37)  HR: 110 (06-07-20 @ 07:37) (98 - 110)  BP: 94/58 (06-07-20 @ 07:37) (86/60 - 94/66)  RR: 16 (06-07-20 @ 07:37) (16 - 16)  SpO2: 100% (06-07-20 @ 07:37)    Weight (kg): 52.5 (07:37)    REVIEW OF SYSTEMS:  For ROV please refer back to H&P     PHYSICAL EXAM:  Constitutional - NAD, Comfortable  HEENT - NCAT, EOMI  Neck - Supple, No limited ROM  Chest - Breathing comfortably, No wheezing  Cardiovascular - S1S2   Abdomen - Soft; +tender to palpation upper abdomen  Extremities - No C/C/E, No calf tenderness; dry skin      LABS:              8.3                  137  | 27   | 5            11.78 >-----------< 616     ------------------------< 97                    25.0                 3.5  | 101  | 0.82                                         Ca 8.6   Mg x     Ph x           TPro  6.3  /  Alb  2.9      TBili  0.6  /  DBili  x         AST  17  /  ALT  24            AlkPhos  99        RADIOLOGY & ADDITIONAL TESTS:    Imaging Personally Reviewed:    [X] Consultant(s) Notes Reviewed:  [X] Care Discussed with Consultants/Other Providers:

## 2020-06-07 NOTE — PROGRESS NOTE ADULT - ASSESSMENT
51 y/o woman with no significant PMH hospitalized with COVID s/p intubation, trach and decannulation, readmitted for chest tightness, and possibly pleuritis now admiited to [facility] for gait Instability, ADL impairments and Functional impairments.    Rheab: Functional and gait instability:  - C/w PT/OT per primary team .    CVS: Chest tightness, SOB, Tachycardia, HLD, pleuritis due to COVID infection  - Was seen by cardiology and likely pleuritis in setting of normal TTE | Orthostatic? will give her gentle fluid hydration   - Pleuritis possibly related to COVID infection or anxiety related, | F/u Neuro Psych consult   - will EKG and TTE here   - C/w Toprol 20 mg PO QD   - C/w Eliquis 5 g PO BID for now; given D-dimer it can be switched to Xarelto 10 mg PO QD   - In initial hospitalization she was Intubated, trach and then decannulated due to COVID infection     Pressure Ulcers- Buttocks  -Left buttock stage 2- barrier cream and allevyn  -Right buttock stage 4- aquacell packing and allevyn dressing  -Wound care consult    GI: Dysphagia   - C/w Protonix 40 mg PO QD     DVT PPX:   - C/w Eliquis 5 mg PO BID for now 51 y/o woman with no significant PMH hospitalized with COVID s/p intubation, trach and decannulation, readmitted for chest tightness, and possibly pleuritis now admiited to [facility] for gait Instability, ADL impairments and Functional impairments.    Rheab: Functional and gait instability:  - C/w PT/OT per primary team .    CVS: Chest tightness, SOB, Tachycardia, HLD, pleuritis due to COVID infection  - Was seen by cardiology and likely pleuritis in setting of normal TTE | Orthostatic? will give her gentle fluid hydration   - Pleuritis possibly related to COVID infection or anxiety related, | F/u Neuro Psych consult   - EKG shows Sinus Tachycardia; F/u TTE and COVID antibody testing  - C/w Toprol 20 mg PO QD   - C/w Eliquis 5 g PO BID for now; given D-dimer it can be switched to Xarelto 10 mg PO QD   - In initial hospitalization she was Intubated, trach and then decannulated due to COVID infection     Pressure Ulcers- Buttocks  -Left buttock stage 2- barrier cream and allevyn  -Right buttock stage 4- aquacell packing and allevyn dressing  -Wound care consult    GI: Dysphagia   - C/w Protonix 40 mg PO QD     DVT PPX:   - C/w Eliquis 5 mg PO BID for now

## 2020-06-07 NOTE — PROGRESS NOTE ADULT - SUBJECTIVE AND OBJECTIVE BOX
Patient is a 50y old  Female who presents with a chief complaint of COVID Debility (06 Jun 2020 11:09)      HPI:  AYLIN PAREDES is a 50 year old woman with no significant PMH who had recent admission for COVID respiratory failure s/p intubation and trach to vent, was on ventilator for 30 days and discharged to [facility] on 5/11/20 and then went home after one day of [facility] on Eliquis 5mg bid. Patient then presented to [facility] on 5/25/20 with chest tightness and left arm numbness, fevers, nausea, abdominal discomfort, and urinary discomfort. Patient had chest tightness, tachycardia, elevated troponin likely secondary to pleurisy from recent COVID infection. ACS, myocarditis/pericarditis unlikely, seen by Cardiology. Pulmonary embolus ruled out with CT chest. Patient also with stage 4 buttocks pressure injury. Gi consulted for dysphagia/pain with swallowing that GI attributed to recent decannulation, placed on puree/soft diet. Tachycardia during admission attributed to possible poor fluid intake. Pleuritic chest pain noted to improve. Completed course of ceftriaxone for UTI. Tested COVID negative on 5/25 (05 Jun 2020 15:41)      TODAY'S SUBJECTIVE & REVIEW OF SYMPTOMS: Patient seen and evaluated this morning. No acute events overnight. Participating well in therapy. Pain is well controlled. Denies chest pain, SOB, nausea, vomiting, constipation or diarrhea. Slept well last night. EKG and echocardiogram results discussed. Simethicone ordered for gas.       [X] Constiutional WNL        [X] Cardio WNL              [X] Resp WNL  [X] GI WNL                            [X]  WNL                    [X] Heme WNL  [X] Endo WNL                       [X] Skin WNL                  [X] MSK WNL  [X] Neuro WNL                     [X] Cognitive WNL         [X] Psych WNL      PHYSICAL EXAM    Vital Signs Last 24 Hrs   Vital Signs Last 24 Hrs  T(C): 37.1 (07 Jun 2020 07:37), Max: 37.1 (07 Jun 2020 07:37)  T(F): 98.8 (07 Jun 2020 07:37), Max: 98.8 (07 Jun 2020 07:37)  HR: 78 (07 Jun 2020 15:46) (78 - 110)  BP: 94/58 (07 Jun 2020 07:37) (86/60 - 94/66)  BP(mean): --  RR: 16 (07 Jun 2020 07:37) (16 - 16)  SpO2: 98% (07 Jun 2020 15:46) (96% - 100%)    Constitutional - NAD, Comfortable  	HEENT - NCAT, EOMI  	Neck - Supple, No limited ROM  	Chest - Breathing comfortably, No wheezing  	Cardiovascular - S1S2   	Abdomen - Soft; +tender to palpation upper abdomen  	Extremities - No C/C/E, No calf tenderness; dry skin  	Neurologic Exam -                 	   Cognitive - Awake, Alert, AAO to self, place, date, year, situation  	   Communication - Fluent, No dysarthria  	   Cranial Nerves - CN 2-12 intact  	   Motor - No focal deficits  	                  LEFT    UE - ShAB 5/5, EF 4/5, EE 5/5, WE 5/5,  4/5  	                  RIGHT UE - ShAB 5/5, EF 4/5, EE 5/5, WE 5/5,  4/5  	                  LEFT    LE - HF 5/5, KE 5/5, DF 5/5, PF 5/5  	                  RIGHT LE - HF 5/5, KE 5/5, DF 5/5, PF 5/5     	   Sensory - Intact to LT  	   Coordination - FTN intact and coordinated movements of fingers intact bilaterally  	   OculoVestibular - no nystagmus  	Psychiatric - Mood stable, Affect WNL  	Skin - Left buttock 1x 0.5 cm pale wound bed with serosanguinous drainage;                               8.3    11.78 )-----------( 616      ( 06 Jun 2020 07:30 )             25.0     06-06    137  |  101  |  5<L>  ----------------------------<  97  3.5   |  27  |  0.82    Ca    8.6      06 Jun 2020 07:30    TPro  6.3  /  Alb  2.9<L>  /  TBili  0.6  /  DBili  x   /  AST  17  /  ALT  24  /  AlkPhos  99  06-06          Right buttock 7x2 cm full thickness wound; minimal slough  RECENT LABS/IMAGING                         ASSESSMENT/PLAN  50/F with no significant PMH hospitalized with COVID s/p intubation, trach and decannulation with Gait Instability, ADL impairments and Functional impairments.    COMORBIDITES/ACTIVE MEDICAL ISSUES   #Post-COVID debility  -start Comprehensive Rehab Program of PT/OT/SLP  -Eliquis 5mg bid for post COVID ppx  -albuterol inhaler prn     #HLD?  -c/w statin    #Pressure Ulcers- Buttocks  -Left buttock stage 2- barrier cream and allevyn  -Right buttock stage 4- aquacell packing and allevyn dressing  -Wound care consult    Pain Mgmt - Tylenol PRN  GI/Bowel Mgmt -  Continent c/w Senna prn; dulcolax supp prn; c/w pantoprazole  /Bladder Mgmt - Continent, PVR    FEN   - Diet - Puree/Soft   - Dysphagia  SLP - evaluation and treatment    Precautions / PROPHYLAXIS:   - Falls, Cardiac, Sternal, Spinal, Seizure   - ortho: Weight bearing status: WBAT   - Lungs: Aspiration, Incentive Spirometer   - Pressure injury/Skin: Turn Q2hrs while in bed, OOB to Chair, PT/OT    - DVT: Lovenox, SCDs, TEDs

## 2020-06-08 LAB
ALBUMIN SERPL ELPH-MCNC: 2.7 G/DL — LOW (ref 3.3–5)
ALP SERPL-CCNC: 88 U/L — SIGNIFICANT CHANGE UP (ref 40–120)
ALT FLD-CCNC: 21 U/L — SIGNIFICANT CHANGE UP (ref 10–45)
ANION GAP SERPL CALC-SCNC: 8 MMOL/L — SIGNIFICANT CHANGE UP (ref 5–17)
AST SERPL-CCNC: 14 U/L — SIGNIFICANT CHANGE UP (ref 10–40)
BASOPHILS # BLD AUTO: 0.07 K/UL — SIGNIFICANT CHANGE UP (ref 0–0.2)
BASOPHILS NFR BLD AUTO: 0.6 % — SIGNIFICANT CHANGE UP (ref 0–2)
BILIRUB SERPL-MCNC: 0.4 MG/DL — SIGNIFICANT CHANGE UP (ref 0.2–1.2)
BUN SERPL-MCNC: 13 MG/DL — SIGNIFICANT CHANGE UP (ref 7–23)
CALCIUM SERPL-MCNC: 8.2 MG/DL — LOW (ref 8.4–10.5)
CHLORIDE SERPL-SCNC: 104 MMOL/L — SIGNIFICANT CHANGE UP (ref 96–108)
CO2 SERPL-SCNC: 28 MMOL/L — SIGNIFICANT CHANGE UP (ref 22–31)
CREAT SERPL-MCNC: 0.96 MG/DL — SIGNIFICANT CHANGE UP (ref 0.5–1.3)
EOSINOPHIL # BLD AUTO: 0.69 K/UL — HIGH (ref 0–0.5)
EOSINOPHIL NFR BLD AUTO: 5.8 % — SIGNIFICANT CHANGE UP (ref 0–6)
GLUCOSE SERPL-MCNC: 116 MG/DL — HIGH (ref 70–99)
HCT VFR BLD CALC: 22.8 % — LOW (ref 34.5–45)
HGB BLD-MCNC: 7.4 G/DL — LOW (ref 11.5–15.5)
IMM GRANULOCYTES NFR BLD AUTO: 1.4 % — SIGNIFICANT CHANGE UP (ref 0–1.5)
IRON SATN MFR SERPL: 18 % — SIGNIFICANT CHANGE UP (ref 14–50)
IRON SATN MFR SERPL: 39 UG/DL — SIGNIFICANT CHANGE UP (ref 30–160)
LYMPHOCYTES # BLD AUTO: 48.3 % — HIGH (ref 13–44)
LYMPHOCYTES # BLD AUTO: 5.78 K/UL — HIGH (ref 1–3.3)
MCHC RBC-ENTMCNC: 30.6 PG — SIGNIFICANT CHANGE UP (ref 27–34)
MCHC RBC-ENTMCNC: 32.5 GM/DL — SIGNIFICANT CHANGE UP (ref 32–36)
MCV RBC AUTO: 94.2 FL — SIGNIFICANT CHANGE UP (ref 80–100)
MONOCYTES # BLD AUTO: 0.74 K/UL — SIGNIFICANT CHANGE UP (ref 0–0.9)
MONOCYTES NFR BLD AUTO: 6.2 % — SIGNIFICANT CHANGE UP (ref 2–14)
NEUTROPHILS # BLD AUTO: 4.51 K/UL — SIGNIFICANT CHANGE UP (ref 1.8–7.4)
NEUTROPHILS NFR BLD AUTO: 37.7 % — LOW (ref 43–77)
NRBC # BLD: 0 /100 WBCS — SIGNIFICANT CHANGE UP (ref 0–0)
PLATELET # BLD AUTO: 561 K/UL — HIGH (ref 150–400)
POTASSIUM SERPL-MCNC: 3.3 MMOL/L — LOW (ref 3.5–5.3)
POTASSIUM SERPL-SCNC: 3.3 MMOL/L — LOW (ref 3.5–5.3)
PROT SERPL-MCNC: 5.9 G/DL — LOW (ref 6–8.3)
RBC # BLD: 2.42 M/UL — LOW (ref 3.8–5.2)
RBC # FLD: 15.6 % — HIGH (ref 10.3–14.5)
SARS-COV-2 IGG SERPL QL IA: POSITIVE
SARS-COV-2 IGM SERPL IA-ACNC: 3.14 INDEX — HIGH
SARS-COV-2 RNA SPEC QL NAA+PROBE: SIGNIFICANT CHANGE UP
SODIUM SERPL-SCNC: 140 MMOL/L — SIGNIFICANT CHANGE UP (ref 135–145)
TIBC SERPL-MCNC: 214 UG/DL — LOW (ref 220–430)
TRANSFERRIN SERPL-MCNC: 160 MG/DL — LOW (ref 200–360)
TSH SERPL-MCNC: 7.48 UIU/ML — HIGH (ref 0.27–4.2)
TSH SERPL-MCNC: 7.6 UIU/ML — HIGH (ref 0.27–4.2)
UIBC SERPL-MCNC: 174 UG/DL — SIGNIFICANT CHANGE UP (ref 110–370)
WBC # BLD: 11.96 K/UL — HIGH (ref 3.8–10.5)
WBC # FLD AUTO: 11.96 K/UL — HIGH (ref 3.8–10.5)

## 2020-06-08 PROCEDURE — 93010 ELECTROCARDIOGRAM REPORT: CPT

## 2020-06-08 PROCEDURE — 99233 SBSQ HOSP IP/OBS HIGH 50: CPT

## 2020-06-08 PROCEDURE — 99232 SBSQ HOSP IP/OBS MODERATE 35: CPT

## 2020-06-08 PROCEDURE — 99223 1ST HOSP IP/OBS HIGH 75: CPT

## 2020-06-08 RX ORDER — SODIUM CHLORIDE 9 MG/ML
500 INJECTION INTRAMUSCULAR; INTRAVENOUS; SUBCUTANEOUS ONCE
Refills: 0 | Status: COMPLETED | OUTPATIENT
Start: 2020-06-08 | End: 2020-06-08

## 2020-06-08 RX ORDER — SODIUM CHLORIDE 9 MG/ML
500 INJECTION INTRAMUSCULAR; INTRAVENOUS; SUBCUTANEOUS
Refills: 0 | Status: DISCONTINUED | OUTPATIENT
Start: 2020-06-08 | End: 2020-06-18

## 2020-06-08 RX ORDER — ASCORBIC ACID 60 MG
500 TABLET,CHEWABLE ORAL
Refills: 0 | Status: DISCONTINUED | OUTPATIENT
Start: 2020-06-08 | End: 2020-06-18

## 2020-06-08 RX ORDER — ZINC SULFATE TAB 220 MG (50 MG ZINC EQUIVALENT) 220 (50 ZN) MG
220 TAB ORAL DAILY
Refills: 0 | Status: COMPLETED | OUTPATIENT
Start: 2020-06-08 | End: 2020-06-16

## 2020-06-08 RX ORDER — POTASSIUM CHLORIDE 20 MEQ
20 PACKET (EA) ORAL
Refills: 0 | Status: COMPLETED | OUTPATIENT
Start: 2020-06-08 | End: 2020-06-08

## 2020-06-08 RX ORDER — FERROUS SULFATE 325(65) MG
325 TABLET ORAL DAILY
Refills: 0 | Status: DISCONTINUED | OUTPATIENT
Start: 2020-06-08 | End: 2020-06-18

## 2020-06-08 RX ADMIN — ALBUTEROL 1 PUFF(S): 90 AEROSOL, METERED ORAL at 04:43

## 2020-06-08 RX ADMIN — ALBUTEROL 1 PUFF(S): 90 AEROSOL, METERED ORAL at 09:17

## 2020-06-08 RX ADMIN — APIXABAN 5 MILLIGRAM(S): 2.5 TABLET, FILM COATED ORAL at 17:59

## 2020-06-08 RX ADMIN — Medication 1 TABLET(S): at 13:17

## 2020-06-08 RX ADMIN — ATORVASTATIN CALCIUM 20 MILLIGRAM(S): 80 TABLET, FILM COATED ORAL at 21:08

## 2020-06-08 RX ADMIN — APIXABAN 5 MILLIGRAM(S): 2.5 TABLET, FILM COATED ORAL at 05:16

## 2020-06-08 RX ADMIN — Medication 20 MILLIEQUIVALENT(S): at 17:58

## 2020-06-08 RX ADMIN — Medication 650 MILLIGRAM(S): at 00:34

## 2020-06-08 RX ADMIN — Medication 1 APPLICATION(S): at 19:53

## 2020-06-08 RX ADMIN — Medication 1 APPLICATION(S): at 05:15

## 2020-06-08 RX ADMIN — SIMETHICONE 80 MILLIGRAM(S): 80 TABLET, CHEWABLE ORAL at 13:17

## 2020-06-08 RX ADMIN — PANTOPRAZOLE SODIUM 40 MILLIGRAM(S): 20 TABLET, DELAYED RELEASE ORAL at 05:48

## 2020-06-08 RX ADMIN — ZINC SULFATE TAB 220 MG (50 MG ZINC EQUIVALENT) 220 MILLIGRAM(S): 220 (50 ZN) TAB at 13:17

## 2020-06-08 RX ADMIN — SIMETHICONE 80 MILLIGRAM(S): 80 TABLET, CHEWABLE ORAL at 05:38

## 2020-06-08 RX ADMIN — SODIUM CHLORIDE 500 MILLILITER(S): 9 INJECTION INTRAMUSCULAR; INTRAVENOUS; SUBCUTANEOUS at 13:57

## 2020-06-08 RX ADMIN — SODIUM CHLORIDE 500 MILLILITER(S): 9 INJECTION INTRAMUSCULAR; INTRAVENOUS; SUBCUTANEOUS at 11:52

## 2020-06-08 RX ADMIN — Medication 20 MILLIEQUIVALENT(S): at 19:50

## 2020-06-08 RX ADMIN — Medication 650 MILLIGRAM(S): at 19:50

## 2020-06-08 NOTE — PROGRESS NOTE ADULT - SUBJECTIVE AND OBJECTIVE BOX
Patient is a 50y old  Female who presents with a chief complaint of COVID Debility (08 Jun 2020 08:57)      Patient seen and examined at bedside early this morning. She complains of feeling nervous this morning. Also complains of low blood pressure when she stands. Denied chest pain, shortness of breath, nausea, vomiting, diarrhea. Later in the morning, she had an episode of hypotension and palpitations during physical therapy.    ALLERGIES:  No Known Allergies    MEDICATIONS  (STANDING):  ammonium lactate 12% Lotion 1 Application(s) Topical two times a day  apixaban 5 milliGRAM(s) Oral two times a day  ascorbic acid 500 milliGRAM(s) Oral two times a day  atorvastatin 20 milliGRAM(s) Oral at bedtime  calcium carbonate   1250 mG (OsCal) 1 Tablet(s) Oral daily  multivitamin 1 Tablet(s) Oral daily  pantoprazole    Tablet 40 milliGRAM(s) Oral before breakfast  simethicone 80 milliGRAM(s) Chew three times a day  sodium chloride 0.9% Bolus 500 milliLiter(s) IV Bolus once  zinc sulfate 220 milliGRAM(s) Oral daily    MEDICATIONS  (PRN):  acetaminophen   Tablet .. 650 milliGRAM(s) Oral every 6 hours PRN Temp greater or equal to 38C (100.4F), Mild Pain (1 - 3)  aluminum hydroxide/magnesium hydroxide/simethicone Suspension 30 milliLiter(s) Oral every 6 hours PRN Dyspepsia  senna 2 Tablet(s) Oral at bedtime PRN Constipation    Vital Signs Last 24 Hrs  T(F): 98.5 (08 Jun 2020 08:56), Max: 98.5 (08 Jun 2020 08:56)  HR: 118 (08 Jun 2020 11:20) (78 - 133)  BP: 97/45 (08 Jun 2020 11:20) (71/54 - 100/44)  RR: 16 (08 Jun 2020 10:08) (16 - 16)  SpO2: 100% (08 Jun 2020 10:08) (98% - 100%)  I&O's Summary    BMI (kg/m2): 21.9 (06-07-20 @ 07:37)  PHYSICAL EXAM:  General: NAD, A/O x 3  ENT: MMM  Neck: Supple, No JVD  Lungs: Clear to auscultation bilaterally  Cardio: RRR, S1/S2, No murmurs  Abdomen: Soft, Nontender, Nondistended; Bowel sounds present  Extremities: No calf tenderness, No pitting edema    LABS:                        8.3    11.78 )-----------( 616      ( 06 Jun 2020 07:30 )             25.0       06-06    137  |  101  |  5   ----------------------------<  97  3.5   |  27  |  0.82    Ca    8.6      06 Jun 2020 07:30    TPro  6.3  /  Alb  2.9  /  TBili  0.6  /  DBili  x   /  AST  17  /  ALT  24  /  AlkPhos  99  06-06     eGFR if Non African American: 84 mL/min/1.73M2 (06-06-20 @ 07:30)  eGFR if : 97 mL/min/1.73M2 (06-06-20 @ 07:30)               TSH 7.48   TSH with FT4 reflex --  Total T3 --                        RADIOLOGY & ADDITIONAL TESTS:    Care Discussed with Consultants/Other Providers:

## 2020-06-08 NOTE — CONSULT NOTE ADULT - ASSESSMENT
Assessment:    FIM scores: Social Interaction ; Problem Solving ; Memory .  Plan: Individual supportive psychotherapy to monitor cognition, affect/mood, and behavior. Cognitive remediation during speech therapy sessions. Participation in recreation/art therapy in order to have pleasure and mastery experiences and regain/reestablish a sense of routine.

## 2020-06-08 NOTE — PROGRESS NOTE ADULT - SUBJECTIVE AND OBJECTIVE BOX
Patient is a 50y old  Female who presents with a chief complaint of COVID Debility (06 Jun 2020 11:09)      HPI:  AYLIN PAREDES is a 50 year old woman with no significant PMH who had recent admission for COVID respiratory failure s/p intubation and trach to vent, was on ventilator for 30 days and discharged to [facility] on 5/11/20 and then went home after one day of [facility] on Eliquis 5mg bid. Patient then presented to [facility] on 5/25/20 with chest tightness and left arm numbness, fevers, nausea, abdominal discomfort, and urinary discomfort. Patient had chest tightness, tachycardia, elevated troponin likely secondary to pleurisy from recent COVID infection. ACS, myocarditis/pericarditis unlikely, seen by Cardiology. Pulmonary embolus ruled out with CT chest. Patient also with stage 4 buttocks pressure injury. Gi consulted for dysphagia/pain with swallowing that GI attributed to recent decannulation, placed on puree/soft diet. Tachycardia during admission attributed to possible poor fluid intake. Pleuritic chest pain noted to improve. Completed course of ceftriaxone for UTI. Tested COVID negative on 5/25 (05 Jun 2020 15:41)      TODAY'S SUBJECTIVE & REVIEW OF SYMPTOMS: patient with HR of 130s this morning and SBP of 70-80 with lightheadedness. No chest pain, SOB, new weakness, nausea, vomiting, diarrhea. She reports drinking lots of water. Afebrile. Labs pending. BP improved with 500 cc bolus with SBP of 94.     [X] Constiutional WNL        [X] Cardio WNL              [X] Resp WNL  [X] GI WNL                            [X]  WNL                    [X] Heme WNL  [X] Endo WNL                       [X] Skin WNL                  [X] MSK WNL  [X] Neuro WNL                     [X] Cognitive WNL         [X] Psych WNL      PHYSICAL EXAM     Vital Signs Last 24 Hrs  T(C): 36.9 (08 Jun 2020 08:56), Max: 36.9 (08 Jun 2020 08:56)  T(F): 98.5 (08 Jun 2020 08:56), Max: 98.5 (08 Jun 2020 08:56)  HR: 118 (08 Jun 2020 11:20) (78 - 133)  BP: 97/45 (08 Jun 2020 11:20) (71/54 - 100/44)  BP(mean): --  RR: 16 (08 Jun 2020 10:08) (16 - 16)  SpO2: 100% (08 Jun 2020 10:08) (98% - 100%)    Constitutional - NAD, Comfortable  	HEENT - NCAT, EOMI  	Neck - Supple, No limited ROM  	Chest - Breathing comfortably, No wheezing  	Cardiovascular - S1S2   	Abdomen - Soft; +tender to palpation upper abdomen  	Extremities - No C/C/E, No calf tenderness; dry skin  	Neurologic Exam -                 	   Cognitive - Awake, Alert, AAO to self, place, date, year, situation  	   Communication - Fluent, No dysarthria  	   Cranial Nerves - CN 2-12 intact  	   Motor - No focal deficits  	                  LEFT    UE - ShAB 5/5, EF 4/5, EE 5/5, WE 5/5,  4/5  	                  RIGHT UE - ShAB 5/5, EF 4/5, EE 5/5, WE 5/5,  4/5  	                  LEFT    LE - HF 5/5, KE 5/5, DF 5/5, PF 5/5  	                  RIGHT LE - HF 5/5, KE 5/5, DF 5/5, PF 5/5     	   Sensory - Intact to LT  	   Coordination - FTN intact and coordinated movements of fingers intact bilaterally  	   OculoVestibular - no nystagmus  	Psychiatric - Mood stable, Affect WNL  	Skin - Left buttock 1x 0.5 cm pale wound bed with serosanguinous drainage;                             Right buttock 7x2 cm full thickness wound; minimal slough  RECENT LABS/IMAGING                         ASSESSMENT/PLAN  50/F with no significant PMH hospitalized with COVID s/p intubation, trach and decannulation with Gait Instability, ADL impairments and Functional impairments.    COMORBIDITES/ACTIVE MEDICAL ISSUES   #Post-COVID debility  -start Comprehensive Rehab Program of PT/OT/SLP  -Eliquis 5mg bid for post COVID ppx  -albuterol inhaler prn     #HLD?  -c/w statin    #Pressure Ulcers- Buttocks  -Left buttock stage 2- barrier cream and allevyn  -Right buttock stage 4- aquacell packing and allevyn dressing  -Wound care consult    Pain Mgmt - Tylenol PRN  GI/Bowel Mgmt -  Continent c/w Senna prn; dulcolax supp prn; c/w pantoprazole  /Bladder Mgmt - Continent, PVR    FEN   - Diet - Puree/Soft   - Dysphagia  SLP - evaluation and treatment    # Hypotensive, tachycardia: some improvement with IV bolus , SBP improved from 70 to 94. pending labs collection. Monitor Hb and electrolytes. encourage PO intake. EKG and echocardiogram reviewed. EF 50-60%.     Precautions / PROPHYLAXIS:   - Falls, Cardiac, Sternal, Spinal, Seizure   - ortho: Weight bearing status: WBAT   - Lungs: Aspiration, Incentive Spirometer   - Pressure injury/Skin: Turn Q2hrs while in bed, OOB to Chair, PT/OT    - DVT: Lovenox, SCDs, TEDs Patient is a 50y old  Female who presents with a chief complaint of COVID Debility (06 Jun 2020 11:09)      HPI:  AYLIN PAREDES is a 50 year old woman with no significant PMH who had recent admission for COVID respiratory failure s/p intubation and trach to vent, was on ventilator for 30 days and discharged to [facility] on 5/11/20 and then went home after one day of [facility] on Eliquis 5mg bid. Patient then presented to [facility] on 5/25/20 with chest tightness and left arm numbness, fevers, nausea, abdominal discomfort, and urinary discomfort. Patient had chest tightness, tachycardia, elevated troponin likely secondary to pleurisy from recent COVID infection. ACS, myocarditis/pericarditis unlikely, seen by Cardiology. Pulmonary embolus ruled out with CT chest. Patient also with stage 4 buttocks pressure injury. Gi consulted for dysphagia/pain with swallowing that GI attributed to recent decannulation, placed on puree/soft diet. Tachycardia during admission attributed to possible poor fluid intake. Pleuritic chest pain noted to improve. Completed course of ceftriaxone for UTI. Tested COVID negative on 5/25 (05 Jun 2020 15:41)      TODAY'S SUBJECTIVE & REVIEW OF SYMPTOMS: patient with HR of 130s this morning and SBP of 70-80 with lightheadedness. No chest pain, SOB, new weakness, nausea, vomiting, diarrhea. She reports drinking lots of water. Afebrile. Labs pending. BP improved with 500 cc bolus with SBP of 94.     [X] Constiutional WNL        [X] Cardio WNL              [X] Resp WNL  [X] GI WNL                            [X]  WNL                    [X] Heme WNL  [X] Endo WNL                       [X] Skin WNL                  [X] MSK WNL  [X] Neuro WNL                     [X] Cognitive WNL         [X] Psych WNL      PHYSICAL EXAM     Vital Signs Last 24 Hrs  T(C): 36.9 (08 Jun 2020 08:56), Max: 36.9 (08 Jun 2020 08:56)  T(F): 98.5 (08 Jun 2020 08:56), Max: 98.5 (08 Jun 2020 08:56)  HR: 118 (08 Jun 2020 11:20) (78 - 133)  BP: 97/45 (08 Jun 2020 11:20) (71/54 - 100/44)  BP(mean): --  RR: 16 (08 Jun 2020 10:08) (16 - 16)  SpO2: 100% (08 Jun 2020 10:08) (98% - 100%)    Constitutional - NAD, Comfortable  	HEENT - NCAT, EOMI  	Neck - Supple, No limited ROM  	Chest - Breathing comfortably, No wheezing  	Cardiovascular - S1S2   	Abdomen - Soft; +tender to palpation upper abdomen  	Extremities - No C/C/E, No calf tenderness; dry skin  	Neurologic Exam -                 	   Cognitive - Awake, Alert, AAO to self, place, date, year, situation  	   Communication - Fluent, No dysarthria  	   Cranial Nerves - CN 2-12 intact  	   Motor - No focal deficits  	                  LEFT    UE - ShAB 5/5, EF 4/5, EE 5/5, WE 5/5,  4/5  	                  RIGHT UE - ShAB 5/5, EF 4/5, EE 5/5, WE 5/5,  4/5  	                  LEFT    LE - HF 5/5, KE 5/5, DF 5/5, PF 5/5  	                  RIGHT LE - HF 5/5, KE 5/5, DF 5/5, PF 5/5     	   Sensory - Intact to LT  	   Coordination - FTN intact and coordinated movements of fingers intact bilaterally  	   OculoVestibular - no nystagmus  	Psychiatric - Mood stable, Affect WNL  		Skin - there is a full thickness wound to the sacral wound measuring 6x4cm with undermining from 12 to 4. Depth of about 2.5cm. Pale pink tissue to wound base. Some slough noted to wound base about 50%. No palpable bone. Periwound intact. No erythema. No odor noted.   No wounds or skin breakdown noted to heels, elbows, back, shoulders. No rashes noted.                                  Right buttock 7x2 cm full thickness wound; minimal slough  RECENT LABS/IMAGING                         ASSESSMENT/PLAN  50/F with no significant PMH hospitalized with COVID s/p intubation, trach and decannulation with Gait Instability, ADL impairments and Functional impairments.    COMORBIDITES/ACTIVE MEDICAL ISSUES   #Post-COVID debility  -start Comprehensive Rehab Program of PT/OT/SLP  -Eliquis 5mg bid for post COVID ppx  -albuterol inhaler prn     #HLD?  -c/w statin    Healing stage 4 pressure ulcer to sacral region   Per chart, wound is staged as 4.   Will start BID dressing changes, wound gel to base and wet to dry dressing, loosely packed. Monitor signs of healing, may benefit from WVAC.     Add MVI, vit c BID, and zinc x 9 days (limit use of zinc to avoid copper deficiency).  Roho cushion while in wc.   Turn and reposition at minimum of q2hrs to offload pressure.   Continue sol and ensure.        Pain Mgmt - Tylenol PRN  GI/Bowel Mgmt -  Continent c/w Senna prn; dulcolax supp prn; c/w pantoprazole  /Bladder Mgmt - Continent, PVR    FEN   - Diet - Puree/Soft   - Dysphagia  SLP - evaluation and treatment    # Hypotensive, tachycardia: some improvement with IV bolus , SBP improved from 70 to 94. pending labs collection. Monitor Hb and electrolytes. encourage PO intake. EKG and echocardiogram reviewed. EF 50-60%.     Precautions / PROPHYLAXIS:   - Falls, Cardiac, Sternal, Spinal, Seizure   - ortho: Weight bearing status: WBAT   - Lungs: Aspiration, Incentive Spirometer   - Pressure injury/Skin: Turn Q2hrs while in bed, OOB to Chair, PT/OT    - DVT: Lovenox, SCDs, TEDs

## 2020-06-08 NOTE — PROGRESS NOTE ADULT - ASSESSMENT
49 y/o woman with no significant PMH hospitalized with COVID s/p intubation, trach and decannulation, readmitted for chest tightness, and possibly pleuritis now admiited to [facility] for gait Instability, ADL impairments and Functional impairments.    Rehab: Functional and gait instability:  - C/w PT/OT per primary team .    CVS: Tachycardia, orthostasis. Could be multifactorial related to anxiety, albuterol. Rule out acute bleed, hyperthyroidism  -Initial EKG showed Sinus Tachycardia  -TTE is largely unremarkable  -Repeat EKG now. Check CBC/CMP -IVF bolus ordered  -TSH is elevated. Check free T4  -D/C albuterol  -Will continue to monitor    Anemia  -Hbg 8.3 on 6/8  -Repeat CBC now  -Check iron, TIBC, transferrin saturation    Pressure Ulcers- Buttocks  -Left buttock stage 2- barrier cream and allevyn  -Right buttock stage 4- aquacell packing and allevyn dressing  -Wound care consult    GI: Dysphagia   - C/w Protonix 40 mg PO QD     DVT PPX:   - C/w Eliquis 5 mg PO BID for now 49 y/o woman with no significant PMH hospitalized with COVID s/p intubation, trach and decannulation, readmitted for chest tightness, and possibly pleuritis now admiited to [facility] for gait Instability, ADL impairments and Functional impairments.    Rehab: Functional and gait instability:  - C/w PT/OT per primary team .    CVS: Tachycardia, orthostasis. Could be multifactorial related to anxiety, albuterol. Rule out acute bleed, hyperthyroidism  -Initial EKG showed Sinus Tachycardia  -TTE is largely unremarkable  -Repeat EKG now. Check CBC/CMP -IVF bolus ordered  -TSH is elevated. Check free T4  -D/C albuterol  -Will continue to monitor    Normocytic anemia  -Hbg 8.3 on 6/8  -Repeat CBC with decrease in Hbg around 1 unit  -Check reticulocyte count, Iron, TIBC  -Check FOBT    Pressure Ulcers- Buttocks  -Left buttock stage 2- barrier cream and allevyn  -Right buttock stage 4- aquacell packing and allevyn dressing  -Wound care consult    GI: Dysphagia   - C/w Protonix 40 mg PO QD     DVT PPX:   - C/w Eliquis 5 mg PO BID for now 51 y/o woman with no significant PMH hospitalized with COVID s/p intubation, trach and decannulation, readmitted for chest tightness, and possibly pleuritis now admiited to [facility] for gait Instability, ADL impairments and Functional impairments.    Rehab: Functional and gait instability:  - C/w PT/OT per primary team .    CVS: Tachycardia, orthostasis. Could be multifactorial related to anxiety, albuterol. Rule out acute bleed, hyperthyroidism  -Initial EKG showed Sinus Tachycardia  -TTE is largely unremarkable  -Repeat EKG now. Check CBC/CMP -IVF bolus ordered  -TSH is elevated. Check free T4  -D/C albuterol  -Will continue to monitor    Normocytic anemia  -Hbg 8.3 on 6/8  -Repeat CBC with decrease in Hbg around 1 unit  -Check reticulocyte count, Iron, TIBC, FOBT  -Check FOBT    Pressure Ulcers- Buttocks  -Left buttock stage 2- barrier cream and allevyn  -Right buttock stage 4- aquacell packing and allevyn dressing  -Wound care consult    GI: Dysphagia   - C/w Protonix 40 mg PO QD     DVT PPX:   - C/w Eliquis 5 mg PO BID for now

## 2020-06-08 NOTE — CONSULT NOTE ADULT - ASSESSMENT
This is a 49 y/o female with no significant PMH hospitalized with COVID s/p intubation, trach and decannulation with Gait Instability, ADL impairments and Functional impairments.      A/P  Healing stage 4 pressure ulcer to sacral region   Per chart, wound is staged as 4.   Will start BID dressing changes, wound gel to base and wet to dry dressing, loosely packed. Monitor signs of healing, may benefit from WVAC.     Add MVI, vit c BID, and zinc x 9 days (limit use of zinc to avoid copper deficiency).  Roho cushion while in wc.   Turn and reposition at minimum of q2hrs to offload pressure.   Continue sol and ensure.

## 2020-06-09 DIAGNOSIS — D64.9 ANEMIA, UNSPECIFIED: ICD-10-CM

## 2020-06-09 DIAGNOSIS — K59.00 CONSTIPATION, UNSPECIFIED: ICD-10-CM

## 2020-06-09 DIAGNOSIS — K21.9 GASTRO-ESOPHAGEAL REFLUX DISEASE WITHOUT ESOPHAGITIS: ICD-10-CM

## 2020-06-09 LAB
ABO RH CONFIRMATION: SIGNIFICANT CHANGE UP
ANION GAP SERPL CALC-SCNC: 13 MMOL/L — SIGNIFICANT CHANGE UP (ref 5–17)
BASOPHILS # BLD AUTO: 0.08 K/UL — SIGNIFICANT CHANGE UP (ref 0–0.2)
BASOPHILS NFR BLD AUTO: 0.6 % — SIGNIFICANT CHANGE UP (ref 0–2)
BLD GP AB SCN SERPL QL: SIGNIFICANT CHANGE UP
BUN SERPL-MCNC: 17 MG/DL — SIGNIFICANT CHANGE UP (ref 7–23)
CALCIUM SERPL-MCNC: 8.8 MG/DL — SIGNIFICANT CHANGE UP (ref 8.4–10.5)
CHLORIDE SERPL-SCNC: 105 MMOL/L — SIGNIFICANT CHANGE UP (ref 96–108)
CO2 SERPL-SCNC: 24 MMOL/L — SIGNIFICANT CHANGE UP (ref 22–31)
CREAT SERPL-MCNC: 0.97 MG/DL — SIGNIFICANT CHANGE UP (ref 0.5–1.3)
EOSINOPHIL # BLD AUTO: 0.73 K/UL — HIGH (ref 0–0.5)
EOSINOPHIL NFR BLD AUTO: 5.7 % — SIGNIFICANT CHANGE UP (ref 0–6)
GLUCOSE SERPL-MCNC: 114 MG/DL — HIGH (ref 70–99)
HCT VFR BLD CALC: 25.2 % — LOW (ref 34.5–45)
HGB BLD-MCNC: 8.3 G/DL — LOW (ref 11.5–15.5)
IMM GRANULOCYTES NFR BLD AUTO: 1.8 % — HIGH (ref 0–1.5)
IRON SATN MFR SERPL: 19 % — SIGNIFICANT CHANGE UP (ref 14–50)
IRON SATN MFR SERPL: 44 UG/DL — SIGNIFICANT CHANGE UP (ref 30–160)
LYMPHOCYTES # BLD AUTO: 43.4 % — SIGNIFICANT CHANGE UP (ref 13–44)
LYMPHOCYTES # BLD AUTO: 5.56 K/UL — HIGH (ref 1–3.3)
MCHC RBC-ENTMCNC: 31.4 PG — SIGNIFICANT CHANGE UP (ref 27–34)
MCHC RBC-ENTMCNC: 32.9 GM/DL — SIGNIFICANT CHANGE UP (ref 32–36)
MCV RBC AUTO: 95.5 FL — SIGNIFICANT CHANGE UP (ref 80–100)
MONOCYTES # BLD AUTO: 0.58 K/UL — SIGNIFICANT CHANGE UP (ref 0–0.9)
MONOCYTES NFR BLD AUTO: 4.5 % — SIGNIFICANT CHANGE UP (ref 2–14)
NEUTROPHILS # BLD AUTO: 5.63 K/UL — SIGNIFICANT CHANGE UP (ref 1.8–7.4)
NEUTROPHILS NFR BLD AUTO: 44 % — SIGNIFICANT CHANGE UP (ref 43–77)
NRBC # BLD: 0 /100 WBCS — SIGNIFICANT CHANGE UP (ref 0–0)
OB PNL STL: POSITIVE
PLATELET # BLD AUTO: 618 K/UL — HIGH (ref 150–400)
POTASSIUM SERPL-MCNC: 3.3 MMOL/L — LOW (ref 3.5–5.3)
POTASSIUM SERPL-SCNC: 3.3 MMOL/L — LOW (ref 3.5–5.3)
RBC # BLD: 2.64 M/UL — LOW (ref 3.8–5.2)
RBC # BLD: 2.64 M/UL — LOW (ref 3.8–5.2)
RBC # FLD: 16 % — HIGH (ref 10.3–14.5)
RETICS #: 100.6 K/UL — SIGNIFICANT CHANGE UP (ref 25–125)
RETICS/RBC NFR: 3.8 % — HIGH (ref 0.5–2.5)
SODIUM SERPL-SCNC: 142 MMOL/L — SIGNIFICANT CHANGE UP (ref 135–145)
TIBC SERPL-MCNC: 235 UG/DL — SIGNIFICANT CHANGE UP (ref 220–430)
UIBC SERPL-MCNC: 191 UG/DL — SIGNIFICANT CHANGE UP (ref 110–370)
WBC # BLD: 12.81 K/UL — HIGH (ref 3.8–10.5)
WBC # FLD AUTO: 12.81 K/UL — HIGH (ref 3.8–10.5)

## 2020-06-09 PROCEDURE — 99232 SBSQ HOSP IP/OBS MODERATE 35: CPT

## 2020-06-09 PROCEDURE — 99233 SBSQ HOSP IP/OBS HIGH 50: CPT

## 2020-06-09 RX ORDER — POTASSIUM CHLORIDE 20 MEQ
40 PACKET (EA) ORAL ONCE
Refills: 0 | Status: COMPLETED | OUTPATIENT
Start: 2020-06-09 | End: 2020-06-09

## 2020-06-09 RX ORDER — POTASSIUM CHLORIDE 20 MEQ
40 PACKET (EA) ORAL EVERY 4 HOURS
Refills: 0 | Status: COMPLETED | OUTPATIENT
Start: 2020-06-09 | End: 2020-06-09

## 2020-06-09 RX ORDER — POLYETHYLENE GLYCOL 3350 17 G/17G
17 POWDER, FOR SOLUTION ORAL DAILY
Refills: 0 | Status: DISCONTINUED | OUTPATIENT
Start: 2020-06-09 | End: 2020-06-18

## 2020-06-09 RX ORDER — PANTOPRAZOLE SODIUM 20 MG/1
40 TABLET, DELAYED RELEASE ORAL
Refills: 0 | Status: DISCONTINUED | OUTPATIENT
Start: 2020-06-09 | End: 2020-06-18

## 2020-06-09 RX ADMIN — PANTOPRAZOLE SODIUM 40 MILLIGRAM(S): 20 TABLET, DELAYED RELEASE ORAL at 06:20

## 2020-06-09 RX ADMIN — Medication 40 MILLIEQUIVALENT(S): at 11:54

## 2020-06-09 RX ADMIN — Medication 500 MILLIGRAM(S): at 05:56

## 2020-06-09 RX ADMIN — Medication 1 APPLICATION(S): at 05:55

## 2020-06-09 RX ADMIN — ZINC SULFATE TAB 220 MG (50 MG ZINC EQUIVALENT) 220 MILLIGRAM(S): 220 (50 ZN) TAB at 11:55

## 2020-06-09 RX ADMIN — Medication 40 MILLIEQUIVALENT(S): at 21:30

## 2020-06-09 RX ADMIN — POLYETHYLENE GLYCOL 3350 17 GRAM(S): 17 POWDER, FOR SOLUTION ORAL at 19:29

## 2020-06-09 RX ADMIN — Medication 325 MILLIGRAM(S): at 11:55

## 2020-06-09 RX ADMIN — Medication 1 TABLET(S): at 11:55

## 2020-06-09 RX ADMIN — SENNA PLUS 2 TABLET(S): 8.6 TABLET ORAL at 19:29

## 2020-06-09 RX ADMIN — PANTOPRAZOLE SODIUM 40 MILLIGRAM(S): 20 TABLET, DELAYED RELEASE ORAL at 17:37

## 2020-06-09 RX ADMIN — Medication 650 MILLIGRAM(S): at 21:32

## 2020-06-09 RX ADMIN — ATORVASTATIN CALCIUM 20 MILLIGRAM(S): 80 TABLET, FILM COATED ORAL at 21:30

## 2020-06-09 RX ADMIN — Medication 1 APPLICATION(S): at 17:38

## 2020-06-09 RX ADMIN — Medication 40 MILLIEQUIVALENT(S): at 17:37

## 2020-06-09 RX ADMIN — APIXABAN 5 MILLIGRAM(S): 2.5 TABLET, FILM COATED ORAL at 05:55

## 2020-06-09 RX ADMIN — Medication 500 MILLIGRAM(S): at 17:36

## 2020-06-09 NOTE — PROGRESS NOTE ADULT - ASSESSMENT
49 y/o woman with no significant PMH hospitalized with COVID s/p intubation, trach and decannulation, readmitted for chest tightness, and possibly pleuritis now admiited to [facility] for gait Instability, ADL impairments and Functional impairments.    Rehab: Functional and gait instability:  - C/w PT/OT per primary team .    CVS: Sinus tachycardia, orthostasis, could be due to anemia  -EKG showed Sinus Tachycardia  -TTE is largely unremarkable  -TSH is elevated. F/u free T4  -D/C albuterol  -Will continue to monitor    Normocytic anemia  -Hbg stable today. Retic % elevated  -1 unit PRBC ordered today for symptomatic anemia  -Monitor CBC  -Check FOBT    Pressure Ulcers- Buttocks  -Left buttock stage 2- barrier cream and allevyn  -Right buttock stage 4- aquacell packing and allevyn dressing  -Wound care consult    GI: Dysphagia   - C/w Protonix 40 mg PO QD     DVT PPX:   - C/w Eliquis 5 mg PO BID for now

## 2020-06-09 NOTE — CONSULT NOTE ADULT - PROBLEM SELECTOR RECOMMENDATION 9
Monitor stools  Monitor H/H  Iron studies  Upper and lower scope if overt bleeding noted or H/H continues to decline   Continue anticoags for now

## 2020-06-09 NOTE — PROGRESS NOTE ADULT - SUBJECTIVE AND OBJECTIVE BOX
SUBJECTIVE & OVERNIGHT EVENTS:  Patient seen and examined.  No acute events overnight.  She continues to have low BPs and elevated HR, as well as symptomatic orthostasis.  +scalp pain.  No CP, cough, n/v/d, dysuria.  Some SOB when sitting up; improves with supine position.       [] Constitutional +fatigue        [X] Cardio WNL              [] Resp +PETERSON  [X] GI WNL                            [X]  WNL                    [X] Heme WNL  [X] Endo WNL                       [X] Skin WNL                  [X] MSK WNL  [X] Neuro WNL                     [X] Cognitive WNL         [X] Psych WN    OBJECTIVE  T(C): 37.1 (06-09-20 @ 08:25), Max: 37.1 (06-09-20 @ 08:25)  HR: 118 (06-09-20 @ 08:25) (117 - 133)  BP: 81/50 (06-09-20 @ 08:25) (81/50 - 111/64)  RR: 16 (06-09-20 @ 08:25) (15 - 16)  SpO2: 94% (06-09-20 @ 08:25) (94% - 98%)      PHYSICAL EXAM   Constitutional: NAD  HEENT: NCAT, EOMI, handling secretions well  Cardio: well-perfused  Resp: symmetric chest-rise, no increased WOB  GI: non-distended  : no downey  Extremities: well-perfused  Skin: +wound dressing c/d/i      LABS:                        8.3    12.81 )-----------( 618      ( 09 Jun 2020 09:23 )             25.2     09 Jun 2020 09:23    142    |  105    |  17     ----------------------------<  114    3.3     |  24     |  0.97     Ca    8.8        09 Jun 2020 09:23    TPro  5.9    /  Alb  2.7    /  TBili  0.4    /  DBili  x      /  AST  14     /  ALT  21     /  AlkPhos  88     08 Jun 2020 13:33          MEDICATIONS   acetaminophen   Tablet .. 650 milliGRAM(s) Oral every 6 hours PRN  aluminum hydroxide/magnesium hydroxide/simethicone Suspension 30 milliLiter(s) Oral every 6 hours PRN  ammonium lactate 12% Lotion 1 Application(s) Topical two times a day  apixaban 5 milliGRAM(s) Oral two times a day  ascorbic acid 500 milliGRAM(s) Oral two times a day  atorvastatin 20 milliGRAM(s) Oral at bedtime  calcium carbonate   1250 mG (OsCal) 1 Tablet(s) Oral daily  ferrous    sulfate 325 milliGRAM(s) Oral daily  multivitamin 1 Tablet(s) Oral daily  pantoprazole    Tablet 40 milliGRAM(s) Oral before breakfast  senna 2 Tablet(s) Oral at bedtime PRN  sodium chloride 0.9%. 500 milliLiter(s) IV Continuous <Continuous>  zinc sulfate 220 milliGRAM(s) Oral daily SUBJECTIVE & OVERNIGHT EVENTS:  Patient seen and examined.  No acute events overnight.  She continues to have low BPs and elevated HR, as well as symptomatic orthostasis.  +scalp pain.  No CP, cough, n/v/d, dysuria.  Some SOB when sitting up; improves with supine position.       [] Constitutional +fatigue        [X] Cardio WNL              [] Resp +PETERSON  [X] GI WNL                            [X]  WNL                    [X] Heme WNL  [X] Endo WNL                       [X] Skin WNL                  [X] MSK WNL  [X] Neuro WNL                     [X] Cognitive WNL         [X] Psych WN    OBJECTIVE  T(C): 37.1 (06-09-20 @ 08:25), Max: 37.1 (06-09-20 @ 08:25)  HR: 118 (06-09-20 @ 08:25) (117 - 133)  BP: 81/50 (06-09-20 @ 08:25) (81/50 - 111/64)  RR: 16 (06-09-20 @ 08:25) (15 - 16)  SpO2: 94% (06-09-20 @ 08:25) (94% - 98%)      PHYSICAL EXAM   Constitutional: NAD  HEENT: NCAT, EOMI, handling secretions well  Cardio: well-perfused  Resp: symmetric chest-rise, no increased WOB  GI: non-distended  : no downey  Extremities: well-perfused  Skin:   Wound on sacrum with minimal slough and no drainage.  Healthy granulation tissue visible.  No bone/muscle visible. Some adipose tissue visible.   1.6cm depth in center  Tunneling from 12-2 o'clock to depth of 2.5cm  Vertical length 5.8cm  Horizontal length 2.5 cm      LABS:                        8.3    12.81 )-----------( 618      ( 09 Jun 2020 09:23 )             25.2     09 Jun 2020 09:23    142    |  105    |  17     ----------------------------<  114    3.3     |  24     |  0.97     Ca    8.8        09 Jun 2020 09:23    TPro  5.9    /  Alb  2.7    /  TBili  0.4    /  DBili  x      /  AST  14     /  ALT  21     /  AlkPhos  88     08 Jun 2020 13:33          MEDICATIONS   acetaminophen   Tablet .. 650 milliGRAM(s) Oral every 6 hours PRN  aluminum hydroxide/magnesium hydroxide/simethicone Suspension 30 milliLiter(s) Oral every 6 hours PRN  ammonium lactate 12% Lotion 1 Application(s) Topical two times a day  apixaban 5 milliGRAM(s) Oral two times a day  ascorbic acid 500 milliGRAM(s) Oral two times a day  atorvastatin 20 milliGRAM(s) Oral at bedtime  calcium carbonate   1250 mG (OsCal) 1 Tablet(s) Oral daily  ferrous    sulfate 325 milliGRAM(s) Oral daily  multivitamin 1 Tablet(s) Oral daily  pantoprazole    Tablet 40 milliGRAM(s) Oral before breakfast  senna 2 Tablet(s) Oral at bedtime PRN  sodium chloride 0.9%. 500 milliLiter(s) IV Continuous <Continuous>  zinc sulfate 220 milliGRAM(s) Oral daily

## 2020-06-09 NOTE — PROGRESS NOTE ADULT - SUBJECTIVE AND OBJECTIVE BOX
Patient is a 50y old  Female who presents with a chief complaint of COVID Debility (08 Jun 2020 12:42)      Patient seen and examined at bedside. She complains of scalp tenderness this morning. Also complains of lethargy as well as dry eyes. Denies shortness of breath. Denies palpitaions.    ALLERGIES:  No Known Allergies    MEDICATIONS  (STANDING):  ammonium lactate 12% Lotion 1 Application(s) Topical two times a day  apixaban 5 milliGRAM(s) Oral two times a day  ascorbic acid 500 milliGRAM(s) Oral two times a day  atorvastatin 20 milliGRAM(s) Oral at bedtime  calcium carbonate   1250 mG (OsCal) 1 Tablet(s) Oral daily  ferrous    sulfate 325 milliGRAM(s) Oral daily  multivitamin 1 Tablet(s) Oral daily  pantoprazole    Tablet 40 milliGRAM(s) Oral before breakfast  potassium chloride    Tablet ER 40 milliEquivalent(s) Oral every 4 hours  sodium chloride 0.9%. 500 milliLiter(s) (500 mL/Hr) IV Continuous <Continuous>  zinc sulfate 220 milliGRAM(s) Oral daily    MEDICATIONS  (PRN):  acetaminophen   Tablet .. 650 milliGRAM(s) Oral every 6 hours PRN Temp greater or equal to 38C (100.4F), Mild Pain (1 - 3)  aluminum hydroxide/magnesium hydroxide/simethicone Suspension 30 milliLiter(s) Oral every 6 hours PRN Dyspepsia  senna 2 Tablet(s) Oral at bedtime PRN Constipation    Vital Signs Last 24 Hrs  T(F): 98.8 (09 Jun 2020 08:25), Max: 98.8 (09 Jun 2020 08:25)  HR: 118 (09 Jun 2020 08:25) (117 - 126)  BP: 81/50 (09 Jun 2020 08:25) (81/50 - 111/64)  RR: 16 (09 Jun 2020 08:25) (15 - 16)  SpO2: 94% (09 Jun 2020 08:25) (94% - 98%)  I&O's Summary    08 Jun 2020 07:01  -  09 Jun 2020 07:00  --------------------------------------------------------  IN: 0 mL / OUT: 3 mL / NET: -3 mL      BMI (kg/m2): 21.9 (06-07-20 @ 07:37)  PHYSICAL EXAM:  General: NAD, A/O x 3  ENT: MMM  Neck: Supple, No JVD  Lungs: Clear to auscultation bilaterally  Cardio: Tachycardic, S1/S2, No murmurs  Abdomen: Soft, Nontender, Nondistended; Bowel sounds present  Extremities: No calf tenderness, No pitting edema    LABS:                        8.3    12.81 )-----------( 618      ( 09 Jun 2020 09:23 )             25.2       06-09    142  |  105  |  17  ----------------------------<  114  3.3   |  24  |  0.97    Ca    8.8      09 Jun 2020 09:23    TPro  5.9  /  Alb  2.7  /  TBili  0.4  /  DBili  x   /  AST  14  /  ALT  21  /  AlkPhos  88  06-08     eGFR if Non African American: 68 mL/min/1.73M2 (06-09-20 @ 09:23)  eGFR if : 79 mL/min/1.73M2 (06-09-20 @ 09:23)               TSH 7.48   TSH with FT4 reflex --  Total T3 --                        RADIOLOGY & ADDITIONAL TESTS:    Care Discussed with Consultants/Other Providers:

## 2020-06-09 NOTE — PROGRESS NOTE ADULT - ASSESSMENT
ASSESSMENT/PLAN:     50/F with no significant PMH hospitalized with COVID s/p intubation, trach and decannulation with Gait Instability, ADL impairments and Functional impairments.    COMORBIDITES/ACTIVE MEDICAL ISSUES     #Post-COVID debility  -start Comprehensive Rehab Program of PT/OT/SLP  -Eliquis 5mg bid for post COVID ppx  -stopped albuterol for tachycardia/hyopkalemia     #Orthostasis/Hypotension/Tachycardia  -may be due to symptomatic anemia  -1U PRBCs on 6/9  -bedside therapies 6/9  -f/u Ft4, FOBT,   -EKG and echocardiogram reviewed. EF 50-60%.     #Hypokalemia  -replete PRN, routine monitoring    #HLD  -c/w statin    #Healing stage 4 pressure ulcer to sacral region   -Per chart, wound is staged as 4.   -Will start BID dressing changes, wound gel to base and wet to dry dressing, loosely packed. Monitor signs of healing, may benefit from WVAC.   -Add MVI, vit c BID, and zinc x 9 days (limit use of zinc to avoid copper deficiency).  -Roho cushion while in wc.   -Turn and reposition at minimum of q2hrs to offload pressure.   -Continue sol and ensure.        Pain Mgmt - Tylenol PRN  GI/Bowel Mgmt -  Continent c/w Senna prn; dulcolax supp prn; c/w pantoprazole  /Bladder Mgmt - Continent, PVR    FEN   - Diet - Puree/Soft   - Dysphagia  SLP - evaluation and treatment      Precautions / PROPHYLAXIS:   - Falls, Cardiac, Sternal, Spinal, Seizure   - ortho: Weight bearing status: WBAT   - Lungs: Aspiration, Incentive Spirometer   - Pressure injury/Skin: Turn Q2hrs while in bed, OOB to Chair, PT/OT    - DVT: Lovenox, SCDs, TEDs         ANTICIPATED DISCHARGE: ASSESSMENT/PLAN:     50/F with no significant PMH hospitalized with COVID s/p intubation, trach and decannulation with Gait Instability, ADL impairments and Functional impairments.    COMORBIDITES/ACTIVE MEDICAL ISSUES     #Post-COVID debility  -start Comprehensive Rehab Program of PT/OT/SLP  -Eliquis 5mg bid for post COVID ppx  -stopped albuterol for tachycardia/hyopkalemia     #Orthostasis/Hypotension/Tachycardia  -may be due to symptomatic anemia  -1U PRBCs on 6/9  -bedside therapies 6/9  -f/u Ft4, FOBT,   -EKG and echocardiogram reviewed. EF 50-60%.     #Hypokalemia  -replete PRN, routine monitoring    #HLD  -c/w statin    #Healing ulcer to sacral region   -Per chart, wound was staged as 4 initially. As of 6/9, stage 3.   -Continue wet to dry dressing, cavilon to periwound, loosely packed with wet to dry dressing. Monitor signs of healing, may benefit from WVAC.   -Add MVI, vit c BID, and zinc x 9 days (limit use of zinc to avoid copper deficiency).  -Roho cushion while in wc.   -Turn and reposition at minimum of q2hrs to offload pressure.   -Continue sol and ensure.        Pain Mgmt - Tylenol PRN  GI/Bowel Mgmt -  Continent c/w Senna prn; dulcolax supp prn; c/w pantoprazole  /Bladder Mgmt - Continent, PVR    FEN   - Diet - Puree/Soft   - Dysphagia  SLP - evaluation and treatment      Precautions / PROPHYLAXIS:   - Falls, Cardiac, Sternal, Spinal, Seizure   - ortho: Weight bearing status: WBAT   - Lungs: Aspiration, Incentive Spirometer   - Pressure injury/Skin: Turn Q2hrs while in bed, OOB to Chair, PT/OT    - DVT: Lovenox, SCDs, TEDs         ANTICIPATED DISCHARGE:

## 2020-06-09 NOTE — CONSULT NOTE ADULT - ATTENDING COMMENTS
Patient seen and examined with Cuca Oleary NP.  Agree with assessment and plan as above.    Middle aged male with recent COVID19 now with anemia.  No overt GI bleeding.  Some abdominal pain.  No melena or BRBPR.    VSS.  Abdomen soft, nontender BS+    PPI  Monitor Hgb/Hct and bowel movements for now  Advance diet as tolerated

## 2020-06-10 LAB
ANION GAP SERPL CALC-SCNC: 9 MMOL/L — SIGNIFICANT CHANGE UP (ref 5–17)
BASOPHILS # BLD AUTO: 0.08 K/UL — SIGNIFICANT CHANGE UP (ref 0–0.2)
BASOPHILS NFR BLD AUTO: 0.8 % — SIGNIFICANT CHANGE UP (ref 0–2)
BUN SERPL-MCNC: 25 MG/DL — HIGH (ref 7–23)
CALCIUM SERPL-MCNC: 8.6 MG/DL — SIGNIFICANT CHANGE UP (ref 8.4–10.5)
CHLORIDE SERPL-SCNC: 107 MMOL/L — SIGNIFICANT CHANGE UP (ref 96–108)
CO2 SERPL-SCNC: 24 MMOL/L — SIGNIFICANT CHANGE UP (ref 22–31)
CREAT SERPL-MCNC: 0.9 MG/DL — SIGNIFICANT CHANGE UP (ref 0.5–1.3)
EOSINOPHIL # BLD AUTO: 0.72 K/UL — HIGH (ref 0–0.5)
EOSINOPHIL NFR BLD AUTO: 7.3 % — HIGH (ref 0–6)
GLUCOSE SERPL-MCNC: 98 MG/DL — SIGNIFICANT CHANGE UP (ref 70–99)
HCT VFR BLD CALC: 28.5 % — LOW (ref 34.5–45)
HGB BLD-MCNC: 9.2 G/DL — LOW (ref 11.5–15.5)
IMM GRANULOCYTES NFR BLD AUTO: 1.6 % — HIGH (ref 0–1.5)
LYMPHOCYTES # BLD AUTO: 4.31 K/UL — HIGH (ref 1–3.3)
LYMPHOCYTES # BLD AUTO: 43.5 % — SIGNIFICANT CHANGE UP (ref 13–44)
MCHC RBC-ENTMCNC: 30.2 PG — SIGNIFICANT CHANGE UP (ref 27–34)
MCHC RBC-ENTMCNC: 32.3 GM/DL — SIGNIFICANT CHANGE UP (ref 32–36)
MCV RBC AUTO: 93.4 FL — SIGNIFICANT CHANGE UP (ref 80–100)
MONOCYTES # BLD AUTO: 0.52 K/UL — SIGNIFICANT CHANGE UP (ref 0–0.9)
MONOCYTES NFR BLD AUTO: 5.3 % — SIGNIFICANT CHANGE UP (ref 2–14)
NEUTROPHILS # BLD AUTO: 4.11 K/UL — SIGNIFICANT CHANGE UP (ref 1.8–7.4)
NEUTROPHILS NFR BLD AUTO: 41.5 % — LOW (ref 43–77)
NRBC # BLD: 0 /100 WBCS — SIGNIFICANT CHANGE UP (ref 0–0)
PLATELET # BLD AUTO: 521 K/UL — HIGH (ref 150–400)
POTASSIUM SERPL-MCNC: 5.1 MMOL/L — SIGNIFICANT CHANGE UP (ref 3.5–5.3)
POTASSIUM SERPL-SCNC: 5.1 MMOL/L — SIGNIFICANT CHANGE UP (ref 3.5–5.3)
RBC # BLD: 3.05 M/UL — LOW (ref 3.8–5.2)
RBC # FLD: 17.1 % — HIGH (ref 10.3–14.5)
SODIUM SERPL-SCNC: 140 MMOL/L — SIGNIFICANT CHANGE UP (ref 135–145)
WBC # BLD: 9.9 K/UL — SIGNIFICANT CHANGE UP (ref 3.8–10.5)
WBC # FLD AUTO: 9.9 K/UL — SIGNIFICANT CHANGE UP (ref 3.8–10.5)

## 2020-06-10 PROCEDURE — 99233 SBSQ HOSP IP/OBS HIGH 50: CPT

## 2020-06-10 PROCEDURE — 96116 NUBHVL XM PHYS/QHP 1ST HR: CPT

## 2020-06-10 RX ORDER — HEPARIN SODIUM 5000 [USP'U]/ML
5000 INJECTION INTRAVENOUS; SUBCUTANEOUS EVERY 12 HOURS
Refills: 0 | Status: DISCONTINUED | OUTPATIENT
Start: 2020-06-10 | End: 2020-06-18

## 2020-06-10 RX ADMIN — Medication 1 TABLET(S): at 12:06

## 2020-06-10 RX ADMIN — Medication 500 MILLIGRAM(S): at 05:58

## 2020-06-10 RX ADMIN — PANTOPRAZOLE SODIUM 40 MILLIGRAM(S): 20 TABLET, DELAYED RELEASE ORAL at 17:04

## 2020-06-10 RX ADMIN — Medication 325 MILLIGRAM(S): at 12:07

## 2020-06-10 RX ADMIN — Medication 500 MILLIGRAM(S): at 17:04

## 2020-06-10 RX ADMIN — ATORVASTATIN CALCIUM 20 MILLIGRAM(S): 80 TABLET, FILM COATED ORAL at 21:33

## 2020-06-10 RX ADMIN — ZINC SULFATE TAB 220 MG (50 MG ZINC EQUIVALENT) 220 MILLIGRAM(S): 220 (50 ZN) TAB at 12:06

## 2020-06-10 RX ADMIN — HEPARIN SODIUM 5000 UNIT(S): 5000 INJECTION INTRAVENOUS; SUBCUTANEOUS at 17:04

## 2020-06-10 RX ADMIN — Medication 1 APPLICATION(S): at 17:07

## 2020-06-10 RX ADMIN — PANTOPRAZOLE SODIUM 40 MILLIGRAM(S): 20 TABLET, DELAYED RELEASE ORAL at 05:58

## 2020-06-10 RX ADMIN — Medication 1 APPLICATION(S): at 05:58

## 2020-06-10 NOTE — CONSULT NOTE ADULT - SUBJECTIVE AND OBJECTIVE BOX
INTERVAL HPI/OVERNIGHT EVENTS:  HPI:  51 y/o female (no pmh) who is admitted to rehab s/p recent hospitalization for COVID. GI consulted to see patient due to drop in H/H and + FOB. Patient seen and examined at bedside. She denies BRBPR or black stools. No hematemesis. She complains of some mid epigastric pain, reflux and gas. She did have colonoscopy about 2 years ago with Dr. Skinner, and states only hemorrhoids noted on exam. She had upper endoscopy in the past and states no significant findings. Per records, patient did complain of dysphagia during hospitalization and was placed on puree diet. Dysphagia was possibly attributed to decannulation.    MEDICATIONS  (STANDING):  ammonium lactate 12% Lotion 1 Application(s) Topical two times a day  apixaban 5 milliGRAM(s) Oral two times a day  ascorbic acid 500 milliGRAM(s) Oral two times a day  atorvastatin 20 milliGRAM(s) Oral at bedtime  calcium carbonate   1250 mG (OsCal) 1 Tablet(s) Oral daily  ferrous    sulfate 325 milliGRAM(s) Oral daily  multivitamin 1 Tablet(s) Oral daily  pantoprazole    Tablet 40 milliGRAM(s) Oral two times a day  polyethylene glycol 3350 17 Gram(s) Oral daily  potassium chloride    Tablet ER 40 milliEquivalent(s) Oral every 4 hours  sodium chloride 0.9%. 500 milliLiter(s) (500 mL/Hr) IV Continuous <Continuous>  zinc sulfate 220 milliGRAM(s) Oral daily    MEDICATIONS  (PRN):  acetaminophen   Tablet .. 650 milliGRAM(s) Oral every 6 hours PRN Temp greater or equal to 38C (100.4F), Mild Pain (1 - 3)  aluminum hydroxide/magnesium hydroxide/simethicone Suspension 30 milliLiter(s) Oral every 6 hours PRN Dyspepsia  artificial  tears Solution 1 Drop(s) Both EYES every 2 hours PRN Dry Eyes  senna 2 Tablet(s) Oral at bedtime PRN Constipation      Allergies    No Known Allergies    Intolerances        PAST MEDICAL & SURGICAL HISTORY:  No pertinent past medical history  No significant past surgical history        PHYSICAL EXAM:   Vital Signs:  Vital Signs Last 24 Hrs  T(C): 37.1 (09 Jun 2020 08:25), Max: 37.1 (09 Jun 2020 08:25)  T(F): 98.8 (09 Jun 2020 08:25), Max: 98.8 (09 Jun 2020 08:25)  HR: 118 (09 Jun 2020 08:25) (118 - 126)  BP: 81/50 (09 Jun 2020 08:25) (81/50 - 111/64)  BP(mean): --  RR: 16 (09 Jun 2020 08:25) (15 - 16)  SpO2: 94% (09 Jun 2020 08:25) (94% - 98%)  Daily     Daily I&O's Summary    08 Jun 2020 07:01  -  09 Jun 2020 07:00  --------------------------------------------------------  IN: 0 mL / OUT: 3 mL / NET: -3 mL        GENERAL:  Appears stated age,  HEENT:  NC/AT,  conjunctivae clear and pink  CHEST:  Full & symmetric excursion, no increased effort, breath sounds clear  HEART:  Regular rhythm, S1, S2, no murmur  ABDOMEN:  Soft, non-tender, non-distended, normoactive bowel sounds,   EXTEREMITIES:  no edema  SKIN:  No rash/warm/dry, ST 4 pressure injury to sacrum   NEURO:  Alert, oriented,       LABS:                        8.3    12.81 )-----------( 618      ( 09 Jun 2020 09:23 )             25.2     06-09    142  |  105  |  17  ----------------------------<  114<H>  3.3<L>   |  24  |  0.97    Ca    8.8      09 Jun 2020 09:23    TPro  5.9<L>  /  Alb  2.7<L>  /  TBili  0.4  /  DBili  x   /  AST  14  /  ALT  21  /  AlkPhos  88  06-08
Pt is a 49 y/o right-handed female with no significant PMH who had recent admission for COVID respiratory failure s/p intubation and trach to vent, was on ventilator for 30 days and discharged to [facility] on 5/11/20 and then went home after one day of [facility] on Eliquis 5mg bid. Pt then presented to [facility] on 5/25/20 with chest tightness and left arm numbness, fevers, nausea, abdominal discomfort, and urinary discomfort. Pt had chest tightness, tachycardia, elevated troponin likely secondary to pleurisy from recent COVID infection. ACS, myocarditis/pericarditis unlikely, seen by Cardiology. Pulmonary embolus ruled out with CT chest. Pt also with stage 4 buttocks pressure injury. GI consulted for dysphagia/pain with swallowing that GI attributed to recent decannulation, placed on puree/soft diet. Tachycardia during admission attributed to possible poor fluid intake. Pleuritic chest pain noted to improve. Completed course of ceftriaxone for UTI. Tested COVID negative on 5/25. PMHx: None. Current meds: Please see list in Pt’s chart. Social Hx: Pt is  and has one adult daughter. 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 She usd to have psychotherapy years ago for anxiety. She lacks hx of substance abuse. Pt is Nondenominational. She enjoys attending parties and cultural events.  Findings: Pt was seen for an initial assessment of her cognitive and emotional functioning. MoCA was administered; Pt’s results (23/30) were in the Mildly Impaired range. Her scores in mood measures suggested Severe levels of anxiety and depression (GAD7 = 21/21; PHQ9 = 21/27). Pt was alert, partly Ox3 (disoriented to day of the week/month), and cooperative. Attn/Conc: Simple auditory attention - impaired. Sustained auditory attention - impaired. Concentration - intact. Working memory for calculations – closely intact (4/5 serial 7s).	Language: Pt’s speech was of normal volume, pitch and pace. Naming - intact. Sentence repetition - intact. Phonemic fluency - intact. Memory: Encoding of 5 words was closely intact (4/5); delayed verbal recall – closely intact (4/5, improving to 5/5 with cueing). LTM was mildly impaired for US presidents (3/4, improving to 4/4 with cueing). Visual memory – intact. Visuospatial: Visuomotor integration – impaired for copy of a 3D figure, micrographia and sloppiness were noted. Executive Functions: Set-shifting - impaired. Organizational skills - intact. Abstract reasoning - intact. Initiation - intact.  Verbal problem solving – mildly impaired. Emotional functioning: Affect - anxious/depressed. Mood - euthymic ("content"); Pt reported experiencing anxiety, depressed mood, crying spells, helplessness/hopelessness, poor sleep, decreased appetite, low energy, and fatigue. On mood measures she additionally reported very frequent anxiety, worry/difficulty controlling her worry, difficulty relaxing, restlessness, irritability, catastrophization, depressed mood, poor sleep, fatigue/low energy, poor appetite, low self-esteem, poor concentration, and psychomotor retardation; and frequent anhedonia and thoughts of death without plan or intent. Thought processes were circumstantial.  No abnormal thought contents were observed; however, she reported suffering from persecutory ideation while at the hospital. Pt reported AH/VH (people) as well as very vivid dreams (with violent content) while in the hospital. Pt denied SI/HI/I/P. Insight - WFL. Judgment - fair.
Pt is a 51 y/o -handed female  with no significant PMH who had recent admission for COVID respiratory failure s/p intubation and trach to vent, was on ventilator for 30 days and discharged to [facility] on 5/11/20 and then went home after one day of [facility] on Eliquis 5mg bid. Pt then presented to [facility] on 5/25/20 with chest tightness and left arm numbness, fevers, nausea, abdominal discomfort, and urinary discomfort. Pt had chest tightness, tachycardia, elevated troponin likely secondary to pleurisy from recent COVID infection. ACS, myocarditis/pericarditis unlikely, seen by Cardiology. Pulmonary embolus ruled out with CT chest. Pt also with stage 4 buttocks pressure injury. Gi consulted for dysphagia/pain with swallowing that GI attributed to recent decannulation, placed on puree/soft diet. Tachycardia during admission attributed to possible poor fluid intake. Pleuritic chest pain noted to improve. Completed course of ceftriaxone for UTI. Tested COVID negative on 5/25.    PMHx:   . Current meds: Please see list in Pt’s chart. Social Hx:   Findings: Pt was seen for an initial assessment of his/her cognitive and emotional functioning. MoCA was administered; Pt’s results (/30) were in the range. His/Her scores in mood measures suggested levels of anxiety and depression (GAD7 = /21; PHQ9 = /27). Pt was alert,  Ox3, and cooperative.  Attn/Conc: Simple auditory attention - . Sustained auditory attention - . Concentration - . Working memory for calculations – ( /5 serial 7s).	 Language: Pt’s speech was of  . Naming - . Sentence repetition - . Phonemic fluency - .	Memory: Encoding of 5 words was (/5); delayed verbal recall – (/5, improving to /5 with cueing). LTM was for US presidents (/4, improving to /4 with cueing). Visual memory –. Visuospatial: Visuomotor integration – for copy of a 3D figure, was noted. Executive Functions: Set-shifting - . Organizational skills - . Abstract reasoning - . Initiation - . Self-awareness - . Verbal problem solving – .   Emotional functioning: Affect - 	. Mood - ; Pt reported experiencing . On mood measures s/he additionally reported .  Thought processes were.  No abnormal thought contents were observed.  Pt denied any AH/VH. Pt also denied SI/HI/I/P. Insight - WFL. Judgment - fair.
This is a 50 year old woman with no significant PMH who had recent admission for COVID respiratory failure s/p intubation and trach to vent, was on ventilator for 30 days and discharged to [facility] on 5/11/20 and then went home after one day of [facility] on Eliquis 5mg bid. Patient then presented to [facility] on 5/25/20 with chest tightness and left arm numbness, fevers, nausea, abdominal discomfort, and urinary discomfort. Patient had chest tightness, tachycardia, elevated troponin likely secondary to pleurisy from recent COVID infection. ACS, myocarditis/pericarditis unlikely, seen by Cardiology. Pulmonary embolus ruled out with CT chest. Patient also with stage 4 buttocks pressure injury. Gi consulted for dysphagia/pain with swallowing that GI attributed to recent decannulation, placed on puree/soft diet. Tachycardia during admission attributed to possible poor fluid intake. Pleuritic chest pain noted to improve. Completed course of ceftriaxone for UTI. Tested COVID negative on 5/25.     Wound care consulted to evaluate sacral wound.     PAST MEDICAL & SURGICAL HISTORY:  No pertinent past medical history  No significant past surgical history    Allergies  No Known Allergies      Social Hx: Denies smoking history, alcohol use or other drug use  	Functional History:  	Independent prior to COVID admission in ADLs and functional mobility  	Lives with daughter in 4th floor walk-up apartment; has RW at home, daughter was providing wound care    Family Hx: Non contributory     ROS: Denies SOB, CP, HA, dizziness, abdominal discomfort. Feeling well this morning. No complaints. States that she is eating and drinking well. No pain at wound site. Reports that wound developed while intubated for COVID virus. Has been hospitalized since March.     MEDICATIONS  (STANDING):  ALBUTerol    90 MICROgram(s) HFA Inhaler 1 Puff(s) Inhalation every 6 hours  ammonium lactate 12% Lotion 1 Application(s) Topical two times a day  apixaban 5 milliGRAM(s) Oral two times a day  ascorbic acid 500 milliGRAM(s) Oral two times a day  atorvastatin 20 milliGRAM(s) Oral at bedtime  calcium carbonate   1250 mG (OsCal) 1 Tablet(s) Oral daily  multivitamin 1 Tablet(s) Oral daily  pantoprazole    Tablet 40 milliGRAM(s) Oral before breakfast  simethicone 80 milliGRAM(s) Chew three times a day  zinc sulfate 220 milliGRAM(s) Oral daily    MEDICATIONS  (PRN):  acetaminophen   Tablet .. 650 milliGRAM(s) Oral every 6 hours PRN Temp greater or equal to 38C (100.4F), Mild Pain (1 - 3)  aluminum hydroxide/magnesium hydroxide/simethicone Suspension 30 milliLiter(s) Oral every 6 hours PRN Dyspepsia  senna 2 Tablet(s) Oral at bedtime PRN Constipation                 8.3    11.78 )-----------( 616      ( 06 Jun 2020 07:30 )             25.0     06-06    137  |  101  |  5<L>  ----------------------------<  97  3.5   |  27  |  0.82    Ca    8.6      06 Jun 2020 07:30    TPro  6.3  /  Alb  2.9<L>  /  TBili  0.6  /  DBili  x   /  AST  17  /  ALT  24  /  AlkPhos  99  06-06      Physical Exam:     Vital Signs Last 24 Hrs  T(C): 36.6 (07 Jun 2020 20:36), Max: 36.6 (07 Jun 2020 20:36)  T(F): 97.9 (07 Jun 2020 20:36), Max: 97.9 (07 Jun 2020 20:36)  HR: 100 (08 Jun 2020 05:11) (78 - 115)  BP: 92/61 (08 Jun 2020 05:11) (92/61 - 97/64)  RR: 16 (08 Jun 2020 05:11) (16 - 16)  SpO2: 100% (08 Jun 2020 05:11) (96% - 100%)    Constitutional - NAD, Comfortable  	Neck - Supple, No limited ROM  	Chest - Breathing comfortably on room air   	Cardiovascular - radial pulste 2+, regular rate  	Abdomen - Soft  	Extremities - No C/C/E, No calf tenderness; dry skin  	Neurologic Exam -                 	   Cognitive - Awake, Alert, AAOx4  	   Communication - Fluent, No dysarthria  	   Motor - No focal deficits  	Psychiatric - Mood stable, Affect WNL  	Skin - there is a full thickness wound to the sacral wound measuring 6x4cm with undermining from 12 to 4. Depth of about 2.5cm. Pale pink tissue to wound base. Some slough noted to wound base about 50%. No palpable bone. Periwound intact. No erythema. No odor noted.   No wounds or skin breakdown noted to heels, elbows, back, shoulders. No rashes noted.
1

## 2020-06-10 NOTE — PROGRESS NOTE ADULT - ASSESSMENT
51 y/o woman with no significant PMH hospitalized with COVID s/p intubation, trach and decannulation, readmitted for chest tightness, and possibly pleuritis now admiited to [facility] for gait Instability, ADL impairments and Functional impairments.    Normocytic anemia  -Patient reports dark stool, could me melena vs effect of oral iron  -FOBT positive, reticulocyte % elevated, BUN elevated  -S/p 1 unit PRBC yesterday   -Hbg increased expectedly to 9.2 today  -Continue to hold anticoagulation  -Monitor CBC, transfuse as needed for symptomatic anemia or Hbg<7    Sinus tachycardia, orthostasis  -TTE is largely unremarkable  -TSH is elevated. F/u free T4  -Continue to hold beta agonists  -Will continue to monitor    Functional and gait instability:  - C/w PT/OT per primary team .    Pressure Ulcers- Buttocks  -Left buttock stage 2- barrier cream and allevyn  -Right buttock stage 4- aquacell packing and allevyn dressing    GERD  - C/w Protonix 40 mg PO QD     DVT PPX:   - SCDs  - Hold Eliquis in the setting of anemia and questionable GI bleed

## 2020-06-10 NOTE — CONSULT NOTE ADULT - ASSESSMENT
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 She exhibited difficulties in simple/sustained attention, calculations, visuospatial skills and aspects of executive functions (set-shifting, problem solving). Her affect is anxious and depressed, and she reports multiple symptoms of depression and anxiety as listed above in response to her current medical status. Also, she reported experiencing psychotic symptoms while at the hospital likely related to delirium. FIM scores: Social Interaction 4; Problem Solving 5; Memory 5.   Plan: Individual supportive psychotherapy to monitor cognition, affect/mood, and behavior. Pt will benefit from standing antidepressant medication and anxiolytic medication as needed. If considered necessary, consultation with psychiatry is appropriate. Participation in recreation/art therapy in order to have pleasure and mastery experiences and regain/reestablish a sense of routine.

## 2020-06-10 NOTE — PROGRESS NOTE ADULT - SUBJECTIVE AND OBJECTIVE BOX
SUBJECTIVE & OVERNIGHT EVENTS:  Patient seen and examined.  No acute events overnight.   Got Blood transfusion yesterday, orthostatic hypotension little better in initial therapy session but then SBP dropped down to 88 in later session.       [] Constitutional +fatigue        [X] Cardio WNL              [] Resp +PETERSON  [X] GI WNL                            [X]  WNL                    [X] Heme WNL  [X] Endo WNL                       [X] Skin WNL                  [X] MSK WNL  [X] Neuro WNL                     [X] Cognitive WNL         [X] Psych WN     ICU Vital Signs Last 24 Hrs  T(C): 36.7 (10 Alex 2020 08:53), Max: 37.2 (09 Jun 2020 16:31)  T(F): 98 (10 Alex 2020 08:53), Max: 99 (09 Jun 2020 16:31)  HR: 110 (10 Alex 2020 08:53) (101 - 133)  BP: 111/81 (10 Alex 2020 08:53) (96/67 - 111/81)  RR: 14 (10 Alex 2020 08:53) (14 - 16)  SpO2: 100% (10 Alex 2020 08:53) (99% - 100%)      PHYSICAL EXAM   Constitutional: NAD  HEENT: NCAT, EOMI, handling secretions well  Cardio: well-perfused  Resp: symmetric chest-rise, no increased WOB  GI: non-distended  : no downey  Extremities: well-perfused  Skin:   Wound on sacrum with minimal slough and no drainage.  Healthy granulation tissue visible.  No bone/muscle visible. Some adipose tissue visible.   1.6cm depth in center  Tunneling from 12-2 o'clock to depth of 2.5cm  Vertical length 5.8cm  Horizontal length 2.5 cm      LABS:                                    9.2    9.90  )-----------( 521      ( 10 Alex 2020 05:30 )             28.5     06-10    140  |  107  |  25<H>  ----------------------------<  98  5.1   |  24  |  0.90    Ca    8.6      10 Alex 2020 05:30                  MEDICATIONS   acetaminophen   Tablet .. 650 milliGRAM(s) Oral every 6 hours PRN  aluminum hydroxide/magnesium hydroxide/simethicone Suspension 30 milliLiter(s) Oral every 6 hours PRN  ammonium lactate 12% Lotion 1 Application(s) Topical two times a day  apixaban 5 milliGRAM(s) Oral two times a day  ascorbic acid 500 milliGRAM(s) Oral two times a day  atorvastatin 20 milliGRAM(s) Oral at bedtime  calcium carbonate   1250 mG (OsCal) 1 Tablet(s) Oral daily  ferrous    sulfate 325 milliGRAM(s) Oral daily  multivitamin 1 Tablet(s) Oral daily  pantoprazole    Tablet 40 milliGRAM(s) Oral before breakfast  senna 2 Tablet(s) Oral at bedtime PRN  sodium chloride 0.9%. 500 milliLiter(s) IV Continuous <Continuous>  zinc sulfate 220 milliGRAM(s) Oral daily

## 2020-06-10 NOTE — PROGRESS NOTE ADULT - SUBJECTIVE AND OBJECTIVE BOX
Patient is a 50y old  Female who presents with a chief complaint of COVID Debility (09 Jun 2020 15:25)      Patient seen and examined at bedside. She reports feeling better today overall after she received a blood transfusion yesterday. States that weakness and headache are improved. Reports dark-colored stool, denies any bright red blood.   ALLERGIES:  No Known Allergies    MEDICATIONS  (STANDING):  ammonium lactate 12% Lotion 1 Application(s) Topical two times a day  ascorbic acid 500 milliGRAM(s) Oral two times a day  atorvastatin 20 milliGRAM(s) Oral at bedtime  calcium carbonate   1250 mG (OsCal) 1 Tablet(s) Oral daily  ferrous    sulfate 325 milliGRAM(s) Oral daily  multivitamin 1 Tablet(s) Oral daily  pantoprazole    Tablet 40 milliGRAM(s) Oral two times a day  polyethylene glycol 3350 17 Gram(s) Oral daily  sodium chloride 0.9%. 500 milliLiter(s) (500 mL/Hr) IV Continuous <Continuous>  zinc sulfate 220 milliGRAM(s) Oral daily    MEDICATIONS  (PRN):  acetaminophen   Tablet .. 650 milliGRAM(s) Oral every 6 hours PRN Temp greater or equal to 38C (100.4F), Mild Pain (1 - 3)  aluminum hydroxide/magnesium hydroxide/simethicone Suspension 30 milliLiter(s) Oral every 6 hours PRN Dyspepsia  artificial  tears Solution 1 Drop(s) Both EYES every 2 hours PRN Dry Eyes  senna 2 Tablet(s) Oral at bedtime PRN Constipation    Vital Signs Last 24 Hrs  T(F): 98 (10 Alex 2020 08:53), Max: 99 (09 Jun 2020 16:31)  HR: 110 (10 Alex 2020 08:53) (101 - 133)  BP: 111/81 (10 Alex 2020 08:53) (96/67 - 111/81)  RR: 14 (10 Alex 2020 08:53) (14 - 16)  SpO2: 100% (10 Alex 2020 08:53) (99% - 100%)  I&O's Summary    BMI (kg/m2): 21.9 (06-07-20 @ 07:37)  PHYSICAL EXAM:  General: NAD, A/O x 3  ENT: MMM  Neck: Supple, No JVD  Lungs: Clear to auscultation bilaterally  Cardio: RRR, S1/S2, No murmurs  Abdomen: Soft, Nontender, Nondistended; Bowel sounds present  Extremities: No calf tenderness, No pitting edema  Skin: Wound on sacrum without drainage     LABS:                        9.2    9.90  )-----------( 521      ( 10 Alex 2020 05:30 )             28.5       06-10    140  |  107  |  25  ----------------------------<  98  5.1   |  24  |  0.90    Ca    8.6      10 Alex 2020 05:30    TPro  5.9  /  Alb  2.7  /  TBili  0.4  /  DBili  x   /  AST  14  /  ALT  21  /  AlkPhos  88  06-08     eGFR if Non African American: 75 mL/min/1.73M2 (06-10-20 @ 05:30)  eGFR if African American: 87 mL/min/1.73M2 (06-10-20 @ 05:30)               TSH 7.48   TSH with FT4 reflex --  Total T3 --                        RADIOLOGY & ADDITIONAL TESTS:    Care Discussed with Consultants/Other Providers:

## 2020-06-10 NOTE — CHART NOTE - NSCHARTNOTEFT_GEN_A_CORE
Nutrition Follow Up Note  Hospital Course   (Per Electronic Medical Record):     Source:  Patient [X]  Medical Record [X]      Diet:   Regular Diet w/ Thin Liquids  Tolerates Diet Well  No Chewing/Swallowing Difficulties  No Recent Nausea, Vomiting, Diarrhea& Some Recent Constipation  Education Provided on Need for Increased Fluids/Fiber   Consumes % of Meals (as Per Documentation)  on Ensure Clear 8oz PO BID (Provides 480kcal & 16grams of Protein) & Darrion 1 Packet BID (Provides 180kcal & 28grams of Protein)  Patient Takes Nutrition Supplements Well  Obtained Food Preferences from Patient    Enteral/Parenteral Nutrition: Not Applicable    Current Weight: 115.7lb on 6/7  Weights Currently Stable @This Time  Obtain Weights Weekly     Pertinent Medications: MEDICATIONS  (STANDING):  ammonium lactate 12% Lotion 1 Application(s) Topical two times a day  ascorbic acid 500 milliGRAM(s) Oral two times a day  atorvastatin 20 milliGRAM(s) Oral at bedtime  calcium carbonate   1250 mG (OsCal) 1 Tablet(s) Oral daily  ferrous    sulfate 325 milliGRAM(s) Oral daily  multivitamin 1 Tablet(s) Oral daily  pantoprazole    Tablet 40 milliGRAM(s) Oral two times a day  polyethylene glycol 3350 17 Gram(s) Oral daily  sodium chloride 0.9%. 500 milliLiter(s) (500 mL/Hr) IV Continuous <Continuous>  zinc sulfate 220 milliGRAM(s) Oral daily    MEDICATIONS  (PRN):  acetaminophen   Tablet .. 650 milliGRAM(s) Oral every 6 hours PRN Temp greater or equal to 38C (100.4F), Mild Pain (1 - 3)  aluminum hydroxide/magnesium hydroxide/simethicone Suspension 30 milliLiter(s) Oral every 6 hours PRN Dyspepsia  artificial  tears Solution 1 Drop(s) Both EYES every 2 hours PRN Dry Eyes  senna 2 Tablet(s) Oral at bedtime PRN Constipation    Pertinent Labs:  06-10 Na140 mmol/L Glu 98 mg/dL K+ 5.1 mmol/L Cr  0.90 mg/dL BUN 25 mg/dL<H> 06-08 Alb 2.7 g/dL<L>    Skin: Multiple Pressure Ulcers     Edema: None Noted     Last Bowel Movement: on 6/9    Estimated Needs:   [X] No Change Since Previous Assessment    Previous Nutrition Diagnosis:   Severe Malnutrition    Nutrition Diagnosis is [X] Ongoing - Continues on Nutrition Supplement & Patient Takes Nutrition Supplement     New Nutrition Diagnosis: [X] Increased Nutrient Needs - Calories & Protein Related to Wound Healing as Evidence By Multiple Pressure Ulcers     Interventions:   1. Education Provided on Need for Increased Fluids/Fiber   2. Recommend Continue Nutrition Plan of Care     Monitoring & Evaluation:   [X] Weights   [X] PO Intake   [X] Skin Integrity   [X] Follow Up (Per Protocol)  [X] Tolerance to Diet Prescription   [X] Other: Labs     Registered Dietitian/Nutritionist Remains Available.  Clarence Bañuelos RDN    Pager # [phone]  Phone# [phone]

## 2020-06-10 NOTE — PROGRESS NOTE ADULT - ASSESSMENT
ASSESSMENT/PLAN:     50/F with no significant PMH hospitalized with COVID s/p intubation, trach and decannulation with Gait Instability, ADL impairments and Functional impairments.    COMORBIDITES/ACTIVE MEDICAL ISSUES     #Post-COVID debility  -start Comprehensive Rehab Program of PT/OT/SLP  -Eliquis 5mg bid for post COVID ppx- changed to heparin due to positive FOBT  -stopped albuterol for tachycardia/hyopkalemia     #Orthostasis/Hypotension/Tachycardia  -may be due to symptomatic anemia  -1U PRBCs on 6/9  -bedside therapies 6/9  FOBT positive: GI recommend monitor for now. increase PPI to BID for GERD.   -EKG and echocardiogram reviewed. EF 50-60%.     #Hypokalemia  -replete PRN, routine monitoring    #HLD  -c/w statin    #Healing ulcer to sacral region   -Per chart, wound was staged as 4 initially. As of 6/9, stage 3.   -Continue wet to dry dressing, cavilon to periwound, loosely packed with wet to dry dressing. Monitor signs of healing, may benefit from WVAC.   -Add MVI, vit c BID, and zinc x 9 days (limit use of zinc to avoid copper deficiency).  -Roho cushion while in wc.   -Turn and reposition at minimum of q2hrs to offload pressure.   -Continue sol and ensure.        Pain Mgmt - Tylenol PRN  GI/Bowel Mgmt -  Continent c/w Senna prn; dulcolax supp prn; c/w pantoprazole  /Bladder Mgmt - Continent, PVR    FEN   - Diet - Puree/Soft   - Dysphagia  SLP - evaluation and treatment      Precautions / PROPHYLAXIS:   - Falls, Cardiac, Sternal, Spinal, Seizure   - ortho: Weight bearing status: WBAT   - Lungs: Aspiration, Incentive Spirometer   - Pressure injury/Skin: Turn Q2hrs while in bed, OOB to Chair, PT/OT    - DVT: Eliquis for COVID prophylaxis changed to heparin due to positive FOBT.         ANTICIPATED DISCHARGE:

## 2020-06-11 LAB — T4 FREE SERPL-MCNC: 1.04 NG/DL — SIGNIFICANT CHANGE UP

## 2020-06-11 PROCEDURE — 99232 SBSQ HOSP IP/OBS MODERATE 35: CPT | Mod: GC

## 2020-06-11 PROCEDURE — 99233 SBSQ HOSP IP/OBS HIGH 50: CPT

## 2020-06-11 RX ORDER — GABAPENTIN 400 MG/1
100 CAPSULE ORAL AT BEDTIME
Refills: 0 | Status: DISCONTINUED | OUTPATIENT
Start: 2020-06-11 | End: 2020-06-18

## 2020-06-11 RX ORDER — SIMETHICONE 80 MG/1
80 TABLET, CHEWABLE ORAL THREE TIMES A DAY
Refills: 0 | Status: DISCONTINUED | OUTPATIENT
Start: 2020-06-11 | End: 2020-06-18

## 2020-06-11 RX ADMIN — Medication 500 MILLIGRAM(S): at 17:31

## 2020-06-11 RX ADMIN — ZINC SULFATE TAB 220 MG (50 MG ZINC EQUIVALENT) 220 MILLIGRAM(S): 220 (50 ZN) TAB at 17:31

## 2020-06-11 RX ADMIN — Medication 325 MILLIGRAM(S): at 11:31

## 2020-06-11 RX ADMIN — Medication 1 TABLET(S): at 11:31

## 2020-06-11 RX ADMIN — PANTOPRAZOLE SODIUM 40 MILLIGRAM(S): 20 TABLET, DELAYED RELEASE ORAL at 17:31

## 2020-06-11 RX ADMIN — Medication 1 APPLICATION(S): at 17:33

## 2020-06-11 RX ADMIN — HEPARIN SODIUM 5000 UNIT(S): 5000 INJECTION INTRAVENOUS; SUBCUTANEOUS at 17:30

## 2020-06-11 RX ADMIN — Medication 500 MILLIGRAM(S): at 05:29

## 2020-06-11 RX ADMIN — Medication 1 APPLICATION(S): at 05:29

## 2020-06-11 RX ADMIN — HEPARIN SODIUM 5000 UNIT(S): 5000 INJECTION INTRAVENOUS; SUBCUTANEOUS at 05:29

## 2020-06-11 RX ADMIN — ATORVASTATIN CALCIUM 20 MILLIGRAM(S): 80 TABLET, FILM COATED ORAL at 21:11

## 2020-06-11 RX ADMIN — PANTOPRAZOLE SODIUM 40 MILLIGRAM(S): 20 TABLET, DELAYED RELEASE ORAL at 05:29

## 2020-06-11 RX ADMIN — POLYETHYLENE GLYCOL 3350 17 GRAM(S): 17 POWDER, FOR SOLUTION ORAL at 11:34

## 2020-06-11 RX ADMIN — GABAPENTIN 100 MILLIGRAM(S): 400 CAPSULE ORAL at 21:11

## 2020-06-11 NOTE — PROGRESS NOTE ADULT - SUBJECTIVE AND OBJECTIVE BOX
SUBJECTIVE & OVERNIGHT EVENTS:  Patient seen and examined.  No acute events overnight.   BPs improved per flowsheets (SPB>100 consistently).  Pt still with scalp pain, PETERSON, and sensation of heart racing.  Also notes some pain over gluteal pressure ulcer.        [ ] Constitutional +fatigue        [X] Cardio WNL              [] Resp +PETERSON  [X] GI WNL                            [X]  WNL                    [X] Heme WNL  [X] Endo WNL                       [X] Skin WNL                  [X] MSK WNL  [X] Neuro WNL                     [X] Cognitive WNL         [X] Psych WN    Vital Signs Last 24 Hrs  T(C): 36.7 (11 Jun 2020 08:40), Max: 36.7 (10 Alex 2020 20:59)  T(F): 98 (11 Jun 2020 08:40), Max: 98 (10 Alex 2020 20:59)  HR: 115 (11 Jun 2020 08:40) (90 - 115)  BP: 100/71 (11 Jun 2020 08:40) (100/60 - 105/71)  RR: 14 (11 Jun 2020 08:40) (14 - 14)  SpO2: 98% (11 Jun 2020 08:40) (98% - 99%)      PHYSICAL EXAM   Constitutional: NAD  HEENT: NCAT, EOMI, handling secretions well  Cardio: well-perfused  Resp: symmetric chest-rise, no increased WOB  GI: non-distended  : no downey  Extremities: well-perfused  Skin:   Wound on sacrum with minimal slough and no drainage.  Healthy granulation tissue visible.  No bone/muscle visible. Some adipose tissue visible.   1.6cm depth in center  Tunneling from 12-2 o'clock to depth of 2.5cm  Vertical length 5.8cm  Horizontal length 2.5 cm      LABS:                                    9.2    9.90  )-----------( 521      ( 10 Alex 2020 05:30 )             28.5     06-10    140  |  107  |  25<H>  ----------------------------<  98  5.1   |  24  |  0.90    Ca    8.6      10 Alex 2020 05:30          MEDICATIONS  (STANDING):  ammonium lactate 12% Lotion 1 Application(s) Topical two times a day  ascorbic acid 500 milliGRAM(s) Oral two times a day  atorvastatin 20 milliGRAM(s) Oral at bedtime  calcium carbonate   1250 mG (OsCal) 1 Tablet(s) Oral daily  ferrous    sulfate 325 milliGRAM(s) Oral daily  heparin   Injectable 5000 Unit(s) SubCutaneous every 12 hours  multivitamin 1 Tablet(s) Oral daily  pantoprazole    Tablet 40 milliGRAM(s) Oral two times a day  polyethylene glycol 3350 17 Gram(s) Oral daily  sodium chloride 0.9%. 500 milliLiter(s) (500 mL/Hr) IV Continuous <Continuous>  zinc sulfate 220 milliGRAM(s) Oral daily    MEDICATIONS  (PRN):  acetaminophen   Tablet .. 650 milliGRAM(s) Oral every 6 hours PRN Temp greater or equal to 38C (100.4F), Mild Pain (1 - 3)  aluminum hydroxide/magnesium hydroxide/simethicone Suspension 30 milliLiter(s) Oral every 6 hours PRN Dyspepsia  artificial  tears Solution 1 Drop(s) Both EYES every 2 hours PRN Dry Eyes  senna 2 Tablet(s) Oral at bedtime PRN Constipation SUBJECTIVE & OVERNIGHT EVENTS:  Patient seen and examined.  No acute events overnight.   BPs improved per flowsheets (SPB>100 consistently).  Pt still with scalp pain, PETERSON, and sensation of heart racing.  Also notes some pain over gluteal pressure ulcer.        [ ] Constitutional +fatigue        [] Cardio +palpitations              [] Resp +PETERSON  [X] GI WNL                            [X]  WNL                    [X] Heme WNL  [X] Endo WNL                       [X] Skin WNL                  [X] MSK WNL  [X] Neuro WNL                     [X] Cognitive WNL         [X] Psych WN    Vital Signs Last 24 Hrs  T(C): 36.7 (11 Jun 2020 08:40), Max: 36.7 (10 Alex 2020 20:59)  T(F): 98 (11 Jun 2020 08:40), Max: 98 (10 Alex 2020 20:59)  HR: 115 (11 Jun 2020 08:40) (90 - 115)  BP: 100/71 (11 Jun 2020 08:40) (100/60 - 105/71)  RR: 14 (11 Jun 2020 08:40) (14 - 14)  SpO2: 98% (11 Jun 2020 08:40) (98% - 99%)      PHYSICAL EXAM   Constitutional: NAD  HEENT: NCAT, EOMI, handling secretions well  Cardio: well-perfused  Resp: symmetric chest-rise, no increased WOB  GI: non-distended  : no downey  Extremities: well-perfused  Skin:   Wound on sacrum with minimal slough and no drainage.  Healthy granulation tissue visible.  No bone/muscle visible. Some adipose tissue visible.   1.6cm depth in center  Tunneling from 12-2 o'clock to depth of 2.5cm  Vertical length 5.8cm  Horizontal length 2.5 cm      LABS:                                    9.2    9.90  )-----------( 521      ( 10 Alex 2020 05:30 )             28.5     06-10    140  |  107  |  25<H>  ----------------------------<  98  5.1   |  24  |  0.90    Ca    8.6      10 Alex 2020 05:30          MEDICATIONS  (STANDING):  ammonium lactate 12% Lotion 1 Application(s) Topical two times a day  ascorbic acid 500 milliGRAM(s) Oral two times a day  atorvastatin 20 milliGRAM(s) Oral at bedtime  calcium carbonate   1250 mG (OsCal) 1 Tablet(s) Oral daily  ferrous    sulfate 325 milliGRAM(s) Oral daily  heparin   Injectable 5000 Unit(s) SubCutaneous every 12 hours  multivitamin 1 Tablet(s) Oral daily  pantoprazole    Tablet 40 milliGRAM(s) Oral two times a day  polyethylene glycol 3350 17 Gram(s) Oral daily  sodium chloride 0.9%. 500 milliLiter(s) (500 mL/Hr) IV Continuous <Continuous>  zinc sulfate 220 milliGRAM(s) Oral daily    MEDICATIONS  (PRN):  acetaminophen   Tablet .. 650 milliGRAM(s) Oral every 6 hours PRN Temp greater or equal to 38C (100.4F), Mild Pain (1 - 3)  aluminum hydroxide/magnesium hydroxide/simethicone Suspension 30 milliLiter(s) Oral every 6 hours PRN Dyspepsia  artificial  tears Solution 1 Drop(s) Both EYES every 2 hours PRN Dry Eyes  senna 2 Tablet(s) Oral at bedtime PRN Constipation

## 2020-06-11 NOTE — PROGRESS NOTE ADULT - ASSESSMENT
ASSESSMENT/PLAN:     50/F with no significant PMH hospitalized with COVID s/p intubation, trach and decannulation with Gait Instability, ADL impairments and Functional impairments.    COMORBIDITES/ACTIVE MEDICAL ISSUES     #Post-COVID debility  -start Comprehensive Rehab Program of PT/OT/SLP  -Eliquis 5mg bid for post COVID ppx- changed to heparin due to positive FOBT  -stopped albuterol for tachycardia/hyopkalemia     #Orthostasis/Hypotension/Tachycardia  -may be due to symptomatic anemia  -1U PRBCs on 6/9  -bedside therapies 6/9  -FOBT positive: GI recommend monitor for now. increase PPI to BID for GERD.   -EKG and echocardiogram reviewed. EF 50-60%.     #Hypokalemia  -replete PRN, routine monitoring    #HLD  -c/w statin    #Healing ulcer to sacral region   -Per chart, wound was staged as 4 initially. As of 6/9, stage 3.   -Continue wet to dry dressing, cavilon to periwound, loosely packed with wet to dry dressing. Monitor signs of healing, may benefit from WVAC.   -Add MVI, vit c BID, and zinc x 9 days (limit use of zinc to avoid copper deficiency).  -Roho cushion while in wc.   -Turn and reposition at minimum of q2hrs to offload pressure.   -Continue sol and ensure.        Pain Mgmt - Tylenol PRN  GI/Bowel Mgmt -  Continent c/w Senna prn; dulcolax supp prn; c/w pantoprazole  /Bladder Mgmt - Continent, PVR    FEN   - Diet - Puree/Soft   - Dysphagia  SLP - evaluation and treatment      Precautions / PROPHYLAXIS:   - Falls, Cardiac, Sternal, Spinal, Seizure   - ortho: Weight bearing status: WBAT   - Lungs: Aspiration, Incentive Spirometer   - Pressure injury/Skin: Turn Q2hrs while in bed, OOB to Chair, PT/OT    - DVT: Eliquis for COVID prophylaxis changed to heparin due to positive FOBT.         ANTICIPATED DISCHARGE: ASSESSMENT/PLAN:     50/F with no significant PMH hospitalized with COVID s/p intubation, trach and decannulation with Gait Instability, ADL impairments and Functional impairments.    COMORBIDITES/ACTIVE MEDICAL ISSUES     #Post-COVID debility  -start Comprehensive Rehab Program of PT/OT/SLP  -Eliquis 5mg bid for post COVID ppx- changed to heparin due to positive FOBT  -stopped albuterol for tachycardia/hyopkalemia   -start pulm rehab     #Orthostasis/Hypotension/Tachycardia  -may be due to symptomatic anemia  -1U PRBCs on 6/9  -bedside therapies 6/9  -FOBT positive: GI recommend monitor for now. increase PPI to BID for GERD.   -EKG and echocardiogram reviewed. EF 50-60%.     #Scalp pain  -likely allodynic   -start low-dose gabapentin at night     #Hypokalemia  -replete PRN, routine monitoring    #HLD  -c/w statin    #Healing ulcer to sacral region   -Per chart, wound was staged as 4 initially. As of 6/9, stage 3.   -Continue wet to dry dressing, cavilon to periwound, loosely packed with wet to dry dressing. Monitor signs of healing, may benefit from WVAC.   -Add MVI, vit c BID, and zinc x 9 days (limit use of zinc to avoid copper deficiency).  -Roho cushion while in wc.   -Turn and reposition at minimum of q2hrs to offload pressure.   -Continue sol and ensure.        Pain Mgmt - Tylenol PRN  GI/Bowel Mgmt -  Continent c/w Senna prn; dulcolax supp prn; c/w pantoprazole  /Bladder Mgmt - Continent, PVR    FEN   - Diet - Puree/Soft   - Dysphagia  SLP - evaluation and treatment      Precautions / PROPHYLAXIS:   - Falls, Cardiac, Sternal, Spinal, Seizure   - ortho: Weight bearing status: WBAT   - Lungs: Aspiration, Incentive Spirometer   - Pressure injury/Skin: Turn Q2hrs while in bed, OOB to Chair, PT/OT    - DVT: Eliquis for COVID prophylaxis changed to heparin due to positive FOBT.         ANTICIPATED DISCHARGE: 6/18 home

## 2020-06-11 NOTE — PROGRESS NOTE ADULT - SUBJECTIVE AND OBJECTIVE BOX
Patient is a 50y old  Female who presents with a chief complaint of COVID Debility (10 Alex 2020 13:44)      Patient seen and examined at bedside. She complains of persistent scalp pain, aggravated by lying down and alleviated by sitting up. Denies chest pain or palpitations. Denies headache, weakness, heat intolerance. Denies melena or hematochezia.    ALLERGIES:  No Known Allergies    MEDICATIONS  (STANDING):  ammonium lactate 12% Lotion 1 Application(s) Topical two times a day  ascorbic acid 500 milliGRAM(s) Oral two times a day  atorvastatin 20 milliGRAM(s) Oral at bedtime  calcium carbonate   1250 mG (OsCal) 1 Tablet(s) Oral daily  ferrous    sulfate 325 milliGRAM(s) Oral daily  gabapentin 100 milliGRAM(s) Oral at bedtime  heparin   Injectable 5000 Unit(s) SubCutaneous every 12 hours  multivitamin 1 Tablet(s) Oral daily  pantoprazole    Tablet 40 milliGRAM(s) Oral two times a day  polyethylene glycol 3350 17 Gram(s) Oral daily  sodium chloride 0.9%. 500 milliLiter(s) (500 mL/Hr) IV Continuous <Continuous>  zinc sulfate 220 milliGRAM(s) Oral daily    MEDICATIONS  (PRN):  acetaminophen   Tablet .. 650 milliGRAM(s) Oral every 6 hours PRN Temp greater or equal to 38C (100.4F), Mild Pain (1 - 3)  aluminum hydroxide/magnesium hydroxide/simethicone Suspension 30 milliLiter(s) Oral every 6 hours PRN Dyspepsia  artificial  tears Solution 1 Drop(s) Both EYES every 2 hours PRN Dry Eyes  senna 2 Tablet(s) Oral at bedtime PRN Constipation  simethicone 80 milliGRAM(s) Chew three times a day PRN Gas    Vital Signs Last 24 Hrs  T(F): 98 (11 Jun 2020 08:40), Max: 98 (10 Alex 2020 20:59)  HR: 115 (11 Jun 2020 08:40) (90 - 115)  BP: 100/71 (11 Jun 2020 08:40) (100/60 - 105/71)  RR: 14 (11 Jun 2020 08:40) (14 - 14)  SpO2: 98% (11 Jun 2020 08:40) (98% - 99%)  I&O's Summary    10 Alex 2020 07:01  -  11 Jun 2020 07:00  --------------------------------------------------------  IN: 220 mL / OUT: 0 mL / NET: 220 mL        PHYSICAL EXAM:  General: NAD, A/O x 3  ENT: MMM  Neck: Supple, No JVD  Lungs: Clear to auscultation bilaterally  Cardio: RRR, S1/S2, No murmurs  Abdomen: Soft, Nontender, Nondistended; Bowel sounds present  Extremities: No calf tenderness, No pitting edema  Skin: Wound on sacrum without drainage     LABS:                        9.2    9.90  )-----------( 521      ( 10 Alex 2020 05:30 )             28.5       06-10    140  |  107  |  25  ----------------------------<  98  5.1   |  24  |  0.90    Ca    8.6      10 Alex 2020 05:30    TPro  5.9  /  Alb  2.7  /  TBili  0.4  /  DBili  x   /  AST  14  /  ALT  21  /  AlkPhos  88  06-08     eGFR if Non African American: 75 mL/min/1.73M2 (06-10-20 @ 05:30)  eGFR if African American: 87 mL/min/1.73M2 (06-10-20 @ 05:30)                                     RADIOLOGY & ADDITIONAL TESTS:    Care Discussed with Consultants/Other Providers: Patient is a 50y old  Female who presents with a chief complaint of COVID Debility (10 Alex 2020 13:44)      Patient seen and examined at bedside. She complains of persistent scalp pain, aggravated by lying down and alleviated by sitting up. Denies chest pain or palpitations. Denies headache, weakness, heat intolerance. Denies melena or hematochezia.    ALLERGIES:  No Known Allergies    MEDICATIONS  (STANDING):  ammonium lactate 12% Lotion 1 Application(s) Topical two times a day  ascorbic acid 500 milliGRAM(s) Oral two times a day  atorvastatin 20 milliGRAM(s) Oral at bedtime  calcium carbonate   1250 mG (OsCal) 1 Tablet(s) Oral daily  ferrous    sulfate 325 milliGRAM(s) Oral daily  gabapentin 100 milliGRAM(s) Oral at bedtime  heparin   Injectable 5000 Unit(s) SubCutaneous every 12 hours  multivitamin 1 Tablet(s) Oral daily  pantoprazole    Tablet 40 milliGRAM(s) Oral two times a day  polyethylene glycol 3350 17 Gram(s) Oral daily  sodium chloride 0.9%. 500 milliLiter(s) (500 mL/Hr) IV Continuous <Continuous>  zinc sulfate 220 milliGRAM(s) Oral daily    MEDICATIONS  (PRN):  acetaminophen   Tablet .. 650 milliGRAM(s) Oral every 6 hours PRN Temp greater or equal to 38C (100.4F), Mild Pain (1 - 3)  aluminum hydroxide/magnesium hydroxide/simethicone Suspension 30 milliLiter(s) Oral every 6 hours PRN Dyspepsia  artificial  tears Solution 1 Drop(s) Both EYES every 2 hours PRN Dry Eyes  senna 2 Tablet(s) Oral at bedtime PRN Constipation  simethicone 80 milliGRAM(s) Chew three times a day PRN Gas    Vital Signs Last 24 Hrs  T(F): 98 (11 Jun 2020 08:40), Max: 98 (10 Alex 2020 20:59)  HR: 115 (11 Jun 2020 08:40) (90 - 115)  BP: 100/71 (11 Jun 2020 08:40) (100/60 - 105/71)  RR: 14 (11 Jun 2020 08:40) (14 - 14)  SpO2: 98% (11 Jun 2020 08:40) (98% - 99%)  I&O's Summary    10 Alex 2020 07:01  -  11 Jun 2020 07:00  --------------------------------------------------------  IN: 220 mL / OUT: 0 mL / NET: 220 mL        PHYSICAL EXAM:  General: NAD, A/O x 3, Appears anxious  ENT: MMM  Neck: Supple, No JVD  Lungs: Clear to auscultation bilaterally  Cardio: RRR, S1/S2, No murmurs  Abdomen: Soft, Nontender, Nondistended; Bowel sounds present  Extremities: No calf tenderness, No pitting edema  Skin: Wound on sacrum without drainage       LABS:                        9.2    9.90  )-----------( 521      ( 10 Alex 2020 05:30 )             28.5       06-10    140  |  107  |  25  ----------------------------<  98  5.1   |  24  |  0.90    Ca    8.6      10 Alex 2020 05:30    TPro  5.9  /  Alb  2.7  /  TBili  0.4  /  DBili  x   /  AST  14  /  ALT  21  /  AlkPhos  88  06-08     eGFR if Non African American: 75 mL/min/1.73M2 (06-10-20 @ 05:30)  eGFR if African American: 87 mL/min/1.73M2 (06-10-20 @ 05:30)                                     RADIOLOGY & ADDITIONAL TESTS:    Care Discussed with Consultants/Other Providers:

## 2020-06-11 NOTE — PROGRESS NOTE ADULT - ASSESSMENT
49 y/o woman with no significant PMH hospitalized with COVID s/p intubation, trach and decannulation, readmitted for chest tightness, and possibly pleuritis now admiited to [facility] for gait Instability, ADL impairments and Functional impairments.    Normocytic anemia  -Patient reports dark stool, could me melena vs effect of oral iron  -FOBT positive, reticulocyte % elevated, BUN elevated  -S/p 1 unit PRBC 6/9  -H/H stable yesterday  -No melena or hematochezia  -Continue to hold anticoagulation  -Monitor CBC, transfuse as needed for symptomatic anemia or Hbg<7    Sinus tachycardia, orthostasis  -TTE is largely unremarkable  -TSH is elevated. F/u free T4  -Continue to hold beta agonists  -Will continue to monitor    Scalp pain  -Likely allodynia, per rehab team  -Started gabapentin  -Will monitor clinical reponse    Functional and gait instability:  - C/w PT/OT per primary team .    Pressure Ulcers- Buttocks  -Left buttock stage 2- barrier cream and allevyn  -Right buttock stage 4- aquacell packing and allevyn dressing    GERD  - C/w Protonix 40 mg PO QD     DVT PPX:   - SCDs  - Hold Eliquis in the setting of anemia and questionable GI bleed 49 y/o woman with no significant PMH hospitalized with COVID s/p intubation, trach and decannulation, readmitted for chest tightness, and possibly pleuritis now admiited to [facility] for gait Instability, ADL impairments and Functional impairments.    Normocytic anemia  -Patient reports dark stool, could me melena vs effect of oral iron  -FOBT positive, reticulocyte % elevated, BUN elevated  -S/p 1 unit PRBC 6/9  -H/H stable yesterday  -No melena or hematochezia  -Continue to hold anticoagulation  -Monitor CBC, transfuse as needed for symptomatic anemia or Hbg<7    Sinus tachycardia, orthostasis  -TTE is largely unremarkable  -TSH is elevated. F/u free T4  -Continue to hold beta agonists  -Will continue to monitor    Possible anxiety or depression disorder  -Patient appears anxious   -Evaluated by psychology yesterday, recommended to start antidepressant and anxiolytic medication  -Would consult psychiatry for evaluation    Scalp pain  -Likely allodynia, per rehab team  -Started gabapentin  -Will monitor clinical reponse    Functional and gait instability:  - C/w PT/OT per primary team .    Pressure Ulcers- Buttocks  -Left buttock stage 2- barrier cream and allevyn  -Right buttock stage 4- aquacell packing and allevyn dressing    GERD  - C/w Protonix 40 mg PO QD     DVT PPX:   - SCDs  - Hold Eliquis in the setting of anemia and questionable GI bleed

## 2020-06-12 LAB
HCT VFR BLD CALC: 28.1 % — LOW (ref 34.5–45)
HGB BLD-MCNC: 9.2 G/DL — LOW (ref 11.5–15.5)
MCHC RBC-ENTMCNC: 30 PG — SIGNIFICANT CHANGE UP (ref 27–34)
MCHC RBC-ENTMCNC: 32.7 GM/DL — SIGNIFICANT CHANGE UP (ref 32–36)
MCV RBC AUTO: 91.5 FL — SIGNIFICANT CHANGE UP (ref 80–100)
NRBC # BLD: 0 /100 WBCS — SIGNIFICANT CHANGE UP (ref 0–0)
PLATELET # BLD AUTO: 493 K/UL — HIGH (ref 150–400)
RBC # BLD: 3.07 M/UL — LOW (ref 3.8–5.2)
RBC # FLD: 16.4 % — HIGH (ref 10.3–14.5)
WBC # BLD: 9.36 K/UL — SIGNIFICANT CHANGE UP (ref 3.8–10.5)
WBC # FLD AUTO: 9.36 K/UL — SIGNIFICANT CHANGE UP (ref 3.8–10.5)

## 2020-06-12 PROCEDURE — 99232 SBSQ HOSP IP/OBS MODERATE 35: CPT | Mod: GC

## 2020-06-12 PROCEDURE — 99232 SBSQ HOSP IP/OBS MODERATE 35: CPT

## 2020-06-12 PROCEDURE — 99233 SBSQ HOSP IP/OBS HIGH 50: CPT

## 2020-06-12 RX ADMIN — PANTOPRAZOLE SODIUM 40 MILLIGRAM(S): 20 TABLET, DELAYED RELEASE ORAL at 17:17

## 2020-06-12 RX ADMIN — Medication 1 TABLET(S): at 13:01

## 2020-06-12 RX ADMIN — Medication 500 MILLIGRAM(S): at 05:39

## 2020-06-12 RX ADMIN — ZINC SULFATE TAB 220 MG (50 MG ZINC EQUIVALENT) 220 MILLIGRAM(S): 220 (50 ZN) TAB at 13:01

## 2020-06-12 RX ADMIN — HEPARIN SODIUM 5000 UNIT(S): 5000 INJECTION INTRAVENOUS; SUBCUTANEOUS at 05:40

## 2020-06-12 RX ADMIN — Medication 1 APPLICATION(S): at 05:39

## 2020-06-12 RX ADMIN — Medication 1 APPLICATION(S): at 17:17

## 2020-06-12 RX ADMIN — Medication 500 MILLIGRAM(S): at 17:17

## 2020-06-12 RX ADMIN — GABAPENTIN 100 MILLIGRAM(S): 400 CAPSULE ORAL at 21:09

## 2020-06-12 RX ADMIN — PANTOPRAZOLE SODIUM 40 MILLIGRAM(S): 20 TABLET, DELAYED RELEASE ORAL at 05:40

## 2020-06-12 RX ADMIN — POLYETHYLENE GLYCOL 3350 17 GRAM(S): 17 POWDER, FOR SOLUTION ORAL at 13:01

## 2020-06-12 RX ADMIN — Medication 325 MILLIGRAM(S): at 13:01

## 2020-06-12 RX ADMIN — HEPARIN SODIUM 5000 UNIT(S): 5000 INJECTION INTRAVENOUS; SUBCUTANEOUS at 17:18

## 2020-06-12 RX ADMIN — ATORVASTATIN CALCIUM 20 MILLIGRAM(S): 80 TABLET, FILM COATED ORAL at 21:09

## 2020-06-12 NOTE — PROGRESS NOTE ADULT - ASSESSMENT
49 y/o woman with no significant PMH hospitalized with COVID s/p intubation, trach and decannulation, readmitted for chest tightness, and possibly pleuritis now admiited to [facility] for gait Instability, ADL impairments and Functional impairments.    Normocytic anemia  -Patient reports dark stool, could me melena vs effect of oral iron  -FOBT positive, reticulocyte % elevated, BUN elevated  -S/p 1 unit PRBC 6/9  -H/H stable today  -Continue to hold anticoagulation  -Monitor CBC, transfuse as needed for symptomatic anemia or Hbg<7    Subclinical hypothyroidism  -TSH is mildly elevated   -FT4 is normal  -Therapy is not indicated at this time, could worsen tachycardia    Sinus tachycardia, orthostasis  -TTE is largely unremarkable  -Continue to hold beta agonists  -Will continue to monitor    Possible anxiety or depression disorder  -Patient appears anxious   -Evaluated by psychology yesterday, recommended to start antidepressant and anxiolytic medication  -Would consult psychiatry for evaluation    Scalp pain  -Likely allodynia, per rehab team  -Started gabapentin  -Will monitor clinical reponse    Functional and gait instability:  - C/w PT/OT per primary team .    Pressure Ulcers- Buttocks  -Left buttock stage 2- barrier cream and allevyn  -Right buttock stage 4- aquacell packing and allevyn dressing    GERD  - C/w Protonix 40 mg PO QD     DVT PPX:   - SCDs

## 2020-06-12 NOTE — PROGRESS NOTE ADULT - SUBJECTIVE AND OBJECTIVE BOX
SUBJECTIVE & OVERNIGHT EVENTS:  Patient seen and examined.  No acute events overnight.   Doing well overall. Scalp pain seems better today.  Pt still with some cough and dyspnea on exertion, but she is participating in therapies well.  She does endorse episodes of waking up "gasping" for air overnight, which are new since COVID dignosis.      [ ] Constitutional +fatigue        [] Cardio +palpitations              [] Resp +PETERSON  [X] GI WNL                            [X]  WNL                    [X] Heme WNL  [X] Endo WNL                       [X] Skin WNL                  [X] MSK WNL  [X] Neuro WNL                     [X] Cognitive WNL         [X] Psych WN    Vital Signs Last 24 Hrs  T(C): 36.8 (12 Jun 2020 08:36), Max: 36.9 (11 Jun 2020 21:52)  T(F): 98.3 (12 Jun 2020 08:36), Max: 98.4 (11 Jun 2020 21:52)  HR: 126 (12 Jun 2020 08:36) (97 - 126)  BP: 95/68 (12 Jun 2020 08:36) (95/68 - 108/64)  BP(mean): --  RR: 14 (12 Jun 2020 08:36) (14 - 14)  SpO2: 99% (12 Jun 2020 08:36) (97% - 99%)      PHYSICAL EXAM   Constitutional: NAD  HEENT: NCAT, EOMI, handling secretions well  Cardio: well-perfused  Resp: symmetric chest-rise, no increased WOB  GI: non-distended  : no downey  Extremities: well-perfused  Skin:   Wound on sacrum with minimal slough and no drainage.  Healthy granulation tissue visible.  No bone/muscle visible. Some adipose tissue visible.   1.6cm depth in center  Tunneling from 12-2 o'clock to depth of 2.5cm  Vertical length 5.8cm  Horizontal length 2.5 cm      LABS:                          9.2    9.36  )-----------( 493      ( 12 Jun 2020 05:30 )             28.1           MEDICATIONS  (STANDING):  ammonium lactate 12% Lotion 1 Application(s) Topical two times a day  ascorbic acid 500 milliGRAM(s) Oral two times a day  atorvastatin 20 milliGRAM(s) Oral at bedtime  calcium carbonate   1250 mG (OsCal) 1 Tablet(s) Oral daily  ferrous    sulfate 325 milliGRAM(s) Oral daily  gabapentin 100 milliGRAM(s) Oral at bedtime  heparin   Injectable 5000 Unit(s) SubCutaneous every 12 hours  multivitamin 1 Tablet(s) Oral daily  pantoprazole    Tablet 40 milliGRAM(s) Oral two times a day  polyethylene glycol 3350 17 Gram(s) Oral daily  sodium chloride 0.9%. 500 milliLiter(s) (500 mL/Hr) IV Continuous <Continuous>  zinc sulfate 220 milliGRAM(s) Oral daily    MEDICATIONS  (PRN):  acetaminophen   Tablet .. 650 milliGRAM(s) Oral every 6 hours PRN Temp greater or equal to 38C (100.4F), Mild Pain (1 - 3)  aluminum hydroxide/magnesium hydroxide/simethicone Suspension 30 milliLiter(s) Oral every 6 hours PRN Dyspepsia  artificial  tears Solution 1 Drop(s) Both EYES every 2 hours PRN Dry Eyes  senna 2 Tablet(s) Oral at bedtime PRN Constipation  simethicone 80 milliGRAM(s) Chew three times a day PRN Gas

## 2020-06-12 NOTE — PROGRESS NOTE ADULT - SUBJECTIVE AND OBJECTIVE BOX
Patient is a 50y old  Female who presents with a chief complaint of COVID Debility (11 Jun 2020 12:52)      Patient seen and examined at bedside. Reports persistent pain in scalp at night, does not interfere with her sleep. She feels better overall, with more energy.     ALLERGIES:  No Known Allergies    MEDICATIONS  (STANDING):  ammonium lactate 12% Lotion 1 Application(s) Topical two times a day  ascorbic acid 500 milliGRAM(s) Oral two times a day  atorvastatin 20 milliGRAM(s) Oral at bedtime  calcium carbonate   1250 mG (OsCal) 1 Tablet(s) Oral daily  ferrous    sulfate 325 milliGRAM(s) Oral daily  gabapentin 100 milliGRAM(s) Oral at bedtime  heparin   Injectable 5000 Unit(s) SubCutaneous every 12 hours  multivitamin 1 Tablet(s) Oral daily  pantoprazole    Tablet 40 milliGRAM(s) Oral two times a day  polyethylene glycol 3350 17 Gram(s) Oral daily  sodium chloride 0.9%. 500 milliLiter(s) (500 mL/Hr) IV Continuous <Continuous>  zinc sulfate 220 milliGRAM(s) Oral daily    MEDICATIONS  (PRN):  acetaminophen   Tablet .. 650 milliGRAM(s) Oral every 6 hours PRN Temp greater or equal to 38C (100.4F), Mild Pain (1 - 3)  aluminum hydroxide/magnesium hydroxide/simethicone Suspension 30 milliLiter(s) Oral every 6 hours PRN Dyspepsia  artificial  tears Solution 1 Drop(s) Both EYES every 2 hours PRN Dry Eyes  senna 2 Tablet(s) Oral at bedtime PRN Constipation  simethicone 80 milliGRAM(s) Chew three times a day PRN Gas    Vital Signs Last 24 Hrs  T(F): 98.3 (12 Jun 2020 08:36), Max: 98.4 (11 Jun 2020 21:52)  HR: 126 (12 Jun 2020 08:36) (97 - 126)  BP: 95/68 (12 Jun 2020 08:36) (95/68 - 108/64)  RR: 14 (12 Jun 2020 08:36) (14 - 14)  SpO2: 99% (12 Jun 2020 08:36) (97% - 99%)  I&O's Summary    11 Jun 2020 07:01  -  12 Jun 2020 07:00  --------------------------------------------------------  IN: 100 mL / OUT: 400 mL / NET: -300 mL        PHYSICAL EXAM:  General: NAD, A/O x 3, Appears anxious  ENT: MMM  Neck: Supple, No JVD  Lungs: Clear to auscultation bilaterally  Cardio: RRR, S1/S2, No murmurs  Abdomen: Soft, Nontender, Nondistended; Bowel sounds present  Extremities: No calf tenderness, No pitting edema  Skin: Wound on sacrum without drainage     LABS:                        9.2    9.36  )-----------( 493      ( 12 Jun 2020 05:30 )             28.1       06-10    140  |  107  |  25  ----------------------------<  98  5.1   |  24  |  0.90    Ca    8.6      10 Alex 2020 05:30       eGFR if Non African American: 75 mL/min/1.73M2 (06-10-20 @ 05:30)  eGFR if African American: 87 mL/min/1.73M2 (06-10-20 @ 05:30)                                     RADIOLOGY & ADDITIONAL TESTS:    Care Discussed with Consultants/Other Providers:

## 2020-06-12 NOTE — PROGRESS NOTE ADULT - ASSESSMENT
ASSESSMENT/PLAN:     50/F with no significant PMH hospitalized with COVID s/p intubation, trach and decannulation with Gait Instability, ADL impairments and Functional impairments.    COMORBIDITES/ACTIVE MEDICAL ISSUES     #Post-COVID debility  -start Comprehensive Rehab Program of PT/OT/SLP  -Eliquis 5mg bid for post COVID ppx- changed to heparin due to positive FOBT  -stopped albuterol for tachycardia/hyopkalemia   -start pulm rehab     #Nocturnal Dyspnea  -check overnight pulse ox    #Orthostasis/Hypotension/Tachycardia  -may be due to symptomatic anemia  -1U PRBCs on 6/9  -H/H stable since 6/9  -FOBT positive: GI recommend monitor for now. increase PPI to BID for GERD.   -EKG and echocardiogram reviewed. EF 50-60%.     #Scalp pain  -likely allodynic   -low-dose gabapentin at night     #Hypokalemia  -replete PRN, routine monitoring    #HLD  -c/w statin    #Healing ulcer to sacral region   -Per chart, wound was staged as 4 initially. As of 6/9, stage 3.   -Continue wet to dry dressing, cavilon to periwound, loosely packed with wet to dry dressing. Monitor signs of healing, may benefit from WVAC.   -Add MVI, vit c BID, and zinc x 9 days (limit use of zinc to avoid copper deficiency).  -Roho cushion while in wc.   -Turn and reposition at minimum of q2hrs to offload pressure.   -Continue sol and ensure.        Pain Mgmt - Tylenol PRN  GI/Bowel Mgmt -  Continent c/w Senna prn; dulcolax supp prn; c/w pantoprazole  /Bladder Mgmt - Continent, PVR    FEN   - Diet - Puree/Soft   - Dysphagia  SLP - evaluation and treatment      Precautions / PROPHYLAXIS:   - Falls, Cardiac, Sternal, Spinal, Seizure   - ortho: Weight bearing status: WBAT   - Lungs: Aspiration, Incentive Spirometer   - Pressure injury/Skin: Turn Q2hrs while in bed, OOB to Chair, PT/OT    - DVT: Eliquis for COVID prophylaxis changed to heparin due to positive FOBT.         ANTICIPATED DISCHARGE: 6/18 home

## 2020-06-13 PROCEDURE — 99233 SBSQ HOSP IP/OBS HIGH 50: CPT

## 2020-06-13 PROCEDURE — 71045 X-RAY EXAM CHEST 1 VIEW: CPT | Mod: 26

## 2020-06-13 PROCEDURE — 99232 SBSQ HOSP IP/OBS MODERATE 35: CPT

## 2020-06-13 RX ORDER — SODIUM CHLORIDE 9 MG/ML
1000 INJECTION INTRAMUSCULAR; INTRAVENOUS; SUBCUTANEOUS
Refills: 0 | Status: COMPLETED | OUTPATIENT
Start: 2020-06-13 | End: 2020-06-14

## 2020-06-13 RX ADMIN — Medication 500 MILLIGRAM(S): at 17:29

## 2020-06-13 RX ADMIN — Medication 1 TABLET(S): at 11:34

## 2020-06-13 RX ADMIN — GABAPENTIN 100 MILLIGRAM(S): 400 CAPSULE ORAL at 20:52

## 2020-06-13 RX ADMIN — Medication 1 APPLICATION(S): at 05:08

## 2020-06-13 RX ADMIN — Medication 1 TABLET(S): at 11:33

## 2020-06-13 RX ADMIN — Medication 325 MILLIGRAM(S): at 11:33

## 2020-06-13 RX ADMIN — Medication 500 MILLIGRAM(S): at 05:06

## 2020-06-13 RX ADMIN — HEPARIN SODIUM 5000 UNIT(S): 5000 INJECTION INTRAVENOUS; SUBCUTANEOUS at 05:06

## 2020-06-13 RX ADMIN — SIMETHICONE 80 MILLIGRAM(S): 80 TABLET, CHEWABLE ORAL at 20:52

## 2020-06-13 RX ADMIN — ATORVASTATIN CALCIUM 20 MILLIGRAM(S): 80 TABLET, FILM COATED ORAL at 20:52

## 2020-06-13 RX ADMIN — SIMETHICONE 80 MILLIGRAM(S): 80 TABLET, CHEWABLE ORAL at 11:37

## 2020-06-13 RX ADMIN — HEPARIN SODIUM 5000 UNIT(S): 5000 INJECTION INTRAVENOUS; SUBCUTANEOUS at 17:29

## 2020-06-13 RX ADMIN — PANTOPRAZOLE SODIUM 40 MILLIGRAM(S): 20 TABLET, DELAYED RELEASE ORAL at 17:29

## 2020-06-13 RX ADMIN — PANTOPRAZOLE SODIUM 40 MILLIGRAM(S): 20 TABLET, DELAYED RELEASE ORAL at 05:06

## 2020-06-13 RX ADMIN — ZINC SULFATE TAB 220 MG (50 MG ZINC EQUIVALENT) 220 MILLIGRAM(S): 220 (50 ZN) TAB at 11:33

## 2020-06-13 RX ADMIN — SODIUM CHLORIDE 100 MILLILITER(S): 9 INJECTION INTRAMUSCULAR; INTRAVENOUS; SUBCUTANEOUS at 15:29

## 2020-06-13 RX ADMIN — POLYETHYLENE GLYCOL 3350 17 GRAM(S): 17 POWDER, FOR SOLUTION ORAL at 11:33

## 2020-06-13 NOTE — PROGRESS NOTE ADULT - SUBJECTIVE AND OBJECTIVE BOX
SUBJECTIVE & OVERNIGHT EVENTS:  Patient seen and examined.  No acute events overnight.  Patient in bed in NAD, no SOB, no n/v, pain controlled    [ ] Constitutional +fatigue        [] Cardio +palpitations              [] Resp +PETERSON  [X] GI WNL                            [X]  WNL                    [ ]Heme WNL  [ ] Endo WNL                       [ ] Skin WNL                  [X] MSK WNL  [X] Neuro WNL                     [X] Cognitive WNL         [X] Psych WN    Vital Signs Last 24 Hrs  T(C): 36.7 (13 Jun 2020 08:38), Max: 36.9 (12 Jun 2020 21:06)  T(F): 98 (13 Jun 2020 08:38), Max: 98.5 (12 Jun 2020 21:06)  HR: 110 (13 Jun 2020 08:38) (110 - 115)  BP: 112/69 (13 Jun 2020 08:38) (93/59 - 112/69)  BP(mean): --  RR: 15 (13 Jun 2020 08:38) (14 - 15)  SpO2: 98% (13 Jun 2020 08:38) (98% - 100%)      PHYSICAL EXAM   Constitutional: NAD  HEENT: NCAT, EOMI, handling secretions well  Cardio: well-perfused  Resp: symmetric chest-rise, no increased WOB  GI: non-distended  : no downey  Extremities: well-perfused  Skin:   Wound on sacrum with minimal slough and no drainage.  Motor:  BLE: 4+-5/5mp      LABS:                          9.2    9.36  )-----------( 493      ( 12 Jun 2020 05:30 )             28.1           MEDICATIONS  (STANDING):  ammonium lactate 12% Lotion 1 Application(s) Topical two times a day  ascorbic acid 500 milliGRAM(s) Oral two times a day  atorvastatin 20 milliGRAM(s) Oral at bedtime  calcium carbonate   1250 mG (OsCal) 1 Tablet(s) Oral daily  ferrous    sulfate 325 milliGRAM(s) Oral daily  gabapentin 100 milliGRAM(s) Oral at bedtime  heparin   Injectable 5000 Unit(s) SubCutaneous every 12 hours  multivitamin 1 Tablet(s) Oral daily  pantoprazole    Tablet 40 milliGRAM(s) Oral two times a day  polyethylene glycol 3350 17 Gram(s) Oral daily  sodium chloride 0.9%. 500 milliLiter(s) (500 mL/Hr) IV Continuous <Continuous>  zinc sulfate 220 milliGRAM(s) Oral daily    MEDICATIONS  (PRN):  acetaminophen   Tablet .. 650 milliGRAM(s) Oral every 6 hours PRN Temp greater or equal to 38C (100.4F), Mild Pain (1 - 3)  aluminum hydroxide/magnesium hydroxide/simethicone Suspension 30 milliLiter(s) Oral every 6 hours PRN Dyspepsia  artificial  tears Solution 1 Drop(s) Both EYES every 2 hours PRN Dry Eyes  senna 2 Tablet(s) Oral at bedtime PRN Constipation  simethicone 80 milliGRAM(s) Chew three times a day PRN Gas

## 2020-06-13 NOTE — PROGRESS NOTE ADULT - SUBJECTIVE AND OBJECTIVE BOX
Hospitalist: Johnny Carpenter DO    CHIEF COMPLAINT: Patient is a 50y old  female who presents with a chief complaint of COVID Debility (13 Jun 2020 09:49)    SUBJECTIVE / OVERNIGHT EVENTS: Patient seen and examined. No acute events overnight. Pain well controlled and patient without any complaints.    MEDICATIONS  (STANDING):  ammonium lactate 12% Lotion 1 Application(s) Topical two times a day  ascorbic acid 500 milliGRAM(s) Oral two times a day  atorvastatin 20 milliGRAM(s) Oral at bedtime  calcium carbonate   1250 mG (OsCal) 1 Tablet(s) Oral daily  ferrous    sulfate 325 milliGRAM(s) Oral daily  gabapentin 100 milliGRAM(s) Oral at bedtime  heparin   Injectable 5000 Unit(s) SubCutaneous every 12 hours  multivitamin 1 Tablet(s) Oral daily  pantoprazole    Tablet 40 milliGRAM(s) Oral two times a day  polyethylene glycol 3350 17 Gram(s) Oral daily  sodium chloride 0.9%. 500 milliLiter(s) (500 mL/Hr) IV Continuous <Continuous>  zinc sulfate 220 milliGRAM(s) Oral daily    MEDICATIONS  (PRN):  acetaminophen   Tablet .. 650 milliGRAM(s) Oral every 6 hours PRN Temp greater or equal to 38C (100.4F), Mild Pain (1 - 3)  aluminum hydroxide/magnesium hydroxide/simethicone Suspension 30 milliLiter(s) Oral every 6 hours PRN Dyspepsia  artificial  tears Solution 1 Drop(s) Both EYES every 2 hours PRN Dry Eyes  senna 2 Tablet(s) Oral at bedtime PRN Constipation  simethicone 80 milliGRAM(s) Chew three times a day PRN Gas    VITALS:  T(F): 98 (06-13-20 @ 08:38), Max: 98.5 (06-12-20 @ 21:06)  HR: 110 (06-13-20 @ 08:38) (110 - 115)  BP: 112/69 (06-13-20 @ 08:38) (93/59 - 112/69)  RR: 15 (06-13-20 @ 08:38) (14 - 15)  SpO2: 98% (06-13-20 @ 08:38)      REVIEW OF SYSTEMS:  For ROV please refer back to H&P     PHYSICAL EXAM:  General: NAD, A/O x 3, Appears anxious  ENT: MMM  Neck: Supple, No JVD  Lungs: Clear to auscultation bilaterally  Cardio: RRR, S1/S2, No murmurs  Abdomen: Soft, Nontender, Nondistended; Bowel sounds present  Extremities: No calf tenderness, No pitting edema  Skin: Wound on sacrum without drainage     LABS:              9.2                  x    | x    | x            9.36  >-----------< 493     ------------------------< x                     28.1                 x    | x    | x                                            Ca x     Mg x     Ph x          RADIOLOGY & ADDITIONAL TESTS:    Imaging Personally Reviewed:    [X] Consultant(s) Notes Reviewed:  [X] Care Discussed with Consultants/Other Providers:

## 2020-06-13 NOTE — PROGRESS NOTE ADULT - ASSESSMENT
51 y/o woman with no significant PMH hospitalized with COVID s/p intubation, trach and decannulation, readmitted for chest tightness, and possibly pleuritis now admiited to [facility] for gait Instability, ADL impairments and Functional impairments.    Rehab: Functional and gait instability:  - C/w PT/OT per primary team     GI: Normocytic anemia with questionable GI Bleed, GERD  -Patient reports dark stool, could me melena vs effect of oral iron  -S/p 1 unit PRBC 6/9  -H/H stable   -Monitor CBC, transfuse as needed for symptomatic anemia or Hbg<7  - C/w Protonix 40 mg PO BID per GI    Endo: Subclinical hypothyroidism, HLD  -TSH is mildly elevated   -FT4 is normal  -Therapy is not indicated at this time, could worsen tachycardia  - C/w Lipitor     CVS: Sinus tachycardia, orthostasis  - TTE is largely unremarkable  - Tcahycardia noted; Will continue to monitor  - Agree with GI to C/w full A/C; recommend restarting Eliquis 5 mg PO BID   - likely some of tachycardia might be related to anxiety/potentially panic attack; consider Psych consult  - Recommend Lexapro 10 mg PO Qd and Xanax 0.25 mg PO PRN for anxiety     Wound: Pressure Ulcers- Buttocks  -Left buttock stage 2- barrier cream and allevyn  -Right buttock stage 4- aquacell packing and allevyn dressing    DVT PPX:   - SCDs 49 y/o woman with no significant PMH hospitalized with COVID s/p intubation, trach and decannulation, readmitted for chest tightness, and possibly pleuritis now admiited to [facility] for gait Instability, ADL impairments and Functional impairments.    Rehab: Functional and gait instability:  - C/w PT/OT per primary team     GI: Normocytic anemia with questionable GI Bleed, GERD  -Patient reports dark stool, could me melena vs effect of oral iron  -S/p 1 unit PRBC 6/9  -H/H stable   -Monitor CBC, transfuse as needed for symptomatic anemia or Hbg<7  - C/w Protonix 40 mg PO BID per GI    Endo: Subclinical hypothyroidism, HLD  -TSH is mildly elevated   -FT4 is normal  -Therapy is not indicated at this time, could worsen tachycardia  - C/w Lipitor     CVS: Sinus tachycardia, orthostasis  - TTE is largely unremarkable  - Tachycardia noted; Will continue to monitor | will start her gentle fluid after Chest xray was viewed. Potentially due to dehydration???  - Agree with GI to C/w full A/C; recommend restarting Eliquis 5 mg PO BID   - likely some of tachycardia might be related to anxiety/potentially panic attack; consider Psych consult  - Recommend Lexapro 10 mg PO Qd and Xanax 0.25 mg PO PRN for anxiety     Wound: Pressure Ulcers- Buttocks  -Left buttock stage 2- barrier cream and allevyn  -Right buttock stage 4- aquacell packing and allevyn dressing    DVT PPX:   - SCDs

## 2020-06-13 NOTE — PROGRESS NOTE ADULT - ASSESSMENT
ASSESSMENT/PLAN:     50/F with no significant PMH hospitalized with COVID s/p intubation, trach and decannulation with Gait Instability, ADL impairments and Functional impairments.    COMORBIDITES/ACTIVE MEDICAL ISSUES     #Post-COVID debility  -continue Comprehensive Rehab Program of PT/OT/SLP  -Eliquis 5mg bid for post COVID ppx- changed to heparin due to positive FOBT  -stopped albuterol for tachycardia/hyopkalemia   - pulm rehab     #Nocturnal Dyspnea  -check overnight pulse ox    #Orthostasis/Hypotension/Tachycardia:  6/13  -may be due to symptomatic anemia  -1U PRBCs on 6/9  -H/H stable since 6/9  -FOBT positive: GI recommend monitor for now. increase PPI to BID for GERD.   -EKG and echocardiogram reviewed. EF 50-60%.  -Hospitalist not appreciated    #Scalp pain  -likely allodynic   -low-dose gabapentin at night     #Hypokalemia  -replete PRN, routine monitoring    #HLD  -c/w statin    #Healing ulcer to sacral region   -Per chart, wound was staged as 4 initially. As of 6/9, stage 3.   -Continue wet to dry dressing, cavilon to periwound, loosely packed with wet to dry dressing. Monitor signs of healing, may benefit from WVAC.   -Add MVI, vit c BID, and zinc x 9 days (limit use of zinc to avoid copper deficiency).  -Roho cushion while in wc.   -Turn and reposition at minimum of q2hrs to offload pressure.   -Continue sol and ensure.        Pain Mgmt - Tylenol PRN  GI/Bowel Mgmt -  Continent c/w Senna prn; dulcolax supp prn; c/w pantoprazole  /Bladder Mgmt - Continent, PVR    FEN   - Diet - Puree/Soft   - Dysphagia  SLP - evaluation and treatment    - DVT: Eliquis for COVID prophylaxis changed to heparin due to positive FOBT.     Labs:  CBC, CMP 6/15

## 2020-06-14 PROCEDURE — 99233 SBSQ HOSP IP/OBS HIGH 50: CPT

## 2020-06-14 PROCEDURE — 99232 SBSQ HOSP IP/OBS MODERATE 35: CPT

## 2020-06-14 RX ORDER — ALPRAZOLAM 0.25 MG
0.25 TABLET ORAL EVERY 6 HOURS
Refills: 0 | Status: DISCONTINUED | OUTPATIENT
Start: 2020-06-14 | End: 2020-06-15

## 2020-06-14 RX ADMIN — Medication 325 MILLIGRAM(S): at 12:21

## 2020-06-14 RX ADMIN — SODIUM CHLORIDE 100 MILLILITER(S): 9 INJECTION INTRAMUSCULAR; INTRAVENOUS; SUBCUTANEOUS at 02:26

## 2020-06-14 RX ADMIN — Medication 1 APPLICATION(S): at 05:26

## 2020-06-14 RX ADMIN — POLYETHYLENE GLYCOL 3350 17 GRAM(S): 17 POWDER, FOR SOLUTION ORAL at 12:21

## 2020-06-14 RX ADMIN — PANTOPRAZOLE SODIUM 40 MILLIGRAM(S): 20 TABLET, DELAYED RELEASE ORAL at 05:25

## 2020-06-14 RX ADMIN — HEPARIN SODIUM 5000 UNIT(S): 5000 INJECTION INTRAVENOUS; SUBCUTANEOUS at 05:25

## 2020-06-14 RX ADMIN — Medication 500 MILLIGRAM(S): at 05:24

## 2020-06-14 RX ADMIN — HEPARIN SODIUM 5000 UNIT(S): 5000 INJECTION INTRAVENOUS; SUBCUTANEOUS at 17:21

## 2020-06-14 RX ADMIN — PANTOPRAZOLE SODIUM 40 MILLIGRAM(S): 20 TABLET, DELAYED RELEASE ORAL at 17:21

## 2020-06-14 RX ADMIN — GABAPENTIN 100 MILLIGRAM(S): 400 CAPSULE ORAL at 21:22

## 2020-06-14 RX ADMIN — ATORVASTATIN CALCIUM 20 MILLIGRAM(S): 80 TABLET, FILM COATED ORAL at 21:21

## 2020-06-14 RX ADMIN — Medication 1 APPLICATION(S): at 17:21

## 2020-06-14 RX ADMIN — Medication 1 TABLET(S): at 12:21

## 2020-06-14 RX ADMIN — Medication 500 MILLIGRAM(S): at 17:21

## 2020-06-14 RX ADMIN — ZINC SULFATE TAB 220 MG (50 MG ZINC EQUIVALENT) 220 MILLIGRAM(S): 220 (50 ZN) TAB at 12:23

## 2020-06-14 NOTE — PROGRESS NOTE ADULT - SUBJECTIVE AND OBJECTIVE BOX
Hospitalist: Johnny Carpenter DO    CHIEF COMPLAINT: Patient is a 50y old  female who presents with a chief complaint of COVID Debility (14 Jun 2020 07:57)    SUBJECTIVE / OVERNIGHT EVENTS: Patient seen and examined. No acute events overnight. Pain well controlled and patient without any complaints.    MEDICATIONS  (STANDING):  ammonium lactate 12% Lotion 1 Application(s) Topical two times a day  ascorbic acid 500 milliGRAM(s) Oral two times a day  atorvastatin 20 milliGRAM(s) Oral at bedtime  calcium carbonate   1250 mG (OsCal) 1 Tablet(s) Oral daily  ferrous    sulfate 325 milliGRAM(s) Oral daily  gabapentin 100 milliGRAM(s) Oral at bedtime  heparin   Injectable 5000 Unit(s) SubCutaneous every 12 hours  multivitamin 1 Tablet(s) Oral daily  pantoprazole    Tablet 40 milliGRAM(s) Oral two times a day  polyethylene glycol 3350 17 Gram(s) Oral daily  sodium chloride 0.9%. 500 milliLiter(s) (500 mL/Hr) IV Continuous <Continuous>  zinc sulfate 220 milliGRAM(s) Oral daily    MEDICATIONS  (PRN):  acetaminophen   Tablet .. 650 milliGRAM(s) Oral every 6 hours PRN Temp greater or equal to 38C (100.4F), Mild Pain (1 - 3)  ALPRAZolam 0.25 milliGRAM(s) Oral every 6 hours PRN Panic attack  aluminum hydroxide/magnesium hydroxide/simethicone Suspension 30 milliLiter(s) Oral every 6 hours PRN Dyspepsia  artificial  tears Solution 1 Drop(s) Both EYES every 2 hours PRN Dry Eyes  senna 2 Tablet(s) Oral at bedtime PRN Constipation  simethicone 80 milliGRAM(s) Chew three times a day PRN Gas      VITALS:  T(F): 98.5 (06-14-20 @ 10:30), Max: 98.5 (06-14-20 @ 10:30)  HR: 109 (06-14-20 @ 10:30) (109 - 112)  BP: 100/67 (06-14-20 @ 10:30) (100/67 - 106/71)  RR: 18 (06-14-20 @ 10:30) (14 - 18)  SpO2: 100% (06-14-20 @ 10:30)      REVIEW OF SYSTEMS:  For ROV please refer back to H&P     PHYSICAL EXAM:  General: NAD, A/O x 3, Appears anxious  ENT: MMM  Neck: Supple, No JVD  Lungs: Clear to auscultation bilaterally  Cardio: RRR, S1/S2, No murmurs  Abdomen: Soft, Nontender, Nondistended; Bowel sounds present  Extremities: No calf tenderness, No pitting edema  Skin: Wound on sacrum without drainage       RADIOLOGY & ADDITIONAL TESTS:    Imaging Personally Reviewed:    [X] Consultant(s) Notes Reviewed:  [X] Care Discussed with Consultants/Other Providers:

## 2020-06-14 NOTE — PROGRESS NOTE ADULT - ASSESSMENT
ASSESSMENT/PLAN:     50/F with no significant PMH hospitalized with COVID s/p intubation, trach and decannulation with Gait Instability, ADL impairments and Functional impairments.    COMORBIDITES/ACTIVE MEDICAL ISSUES     #Post-COVID debility  -continue Comprehensive Rehab Program of PT/OT/SLP  -Eliquis 5mg bid for post COVID ppx- changed to heparin due to positive FOBT  -stopped albuterol for tachycardia/hyopkalemia   - pulm rehab     #Nocturnal Dyspnea  -check overnight pulse ox    #Orthostasis/Hypotension/Tachycardia: -113 6/13  -may be due to symptomatic anemia  -1U PRBCs on 6/9  -H/H stable since 6/9  -FOBT positive: GI recommend monitor for now. increase PPI to BID for GERD.   -EKG and echocardiogram reviewed. EF 50-60%.  -Hospitalist not appreciated    #Scalp pain  -likely allodynic   -low-dose gabapentin at night     #Hypokalemia  -replete PRN, routine monitoring    #HLD  -c/w statin    #Healing ulcer to sacral region   -Per chart, wound was staged as 4 initially. As of 6/9, stage 3.   -Continue wet to dry dressing, cavilon to periwound, loosely packed with wet to dry dressing. Monitor signs of healing, may benefit from WVAC.   -Add MVI, vit c BID, and zinc x 9 days (limit use of zinc to avoid copper deficiency).  -Roho cushion while in wc.   -Turn and reposition at minimum of q2hrs to offload pressure.   -Continue sol and ensure.        Pain Mgmt - Tylenol PRN  GI/Bowel Mgmt -  Continent c/w Senna prn; dulcolax supp prn; c/w pantoprazole  /Bladder Mgmt - Continent, PVR    FEN   - Diet - Puree/Soft   - Dysphagia  SLP - evaluation and treatment    - DVT: Eliquis for COVID prophylaxis changed to heparin due to positive FOBT.     Labs:  CBC, CMP 6/15

## 2020-06-14 NOTE — PROGRESS NOTE ADULT - ASSESSMENT
51 y/o woman with no significant PMH hospitalized with COVID s/p intubation, trach and decannulation, readmitted for chest tightness, and possibly pleuritis now admiited to [facility] for gait Instability, ADL impairments and Functional impairments.    Rehab: Functional and gait instability:  - C/w PT/OT per primary team     GI: Normocytic anemia with questionable GI Bleed, GERD  -Patient reports dark stool, could me melena vs effect of oral iron  -S/p 1 unit PRBC 6/9  -H/H stable   -Monitor CBC, transfuse as needed for symptomatic anemia or Hbg<7  -C/w Protonix 40 mg PO BID per GI    Endo: Subclinical hypothyroidism, HLD  -TSH is mildly elevated   -FT4 is normal  -Therapy is not indicated at this time, could worsen tachycardia  - C/w Lipitor     CVS: Sinus tachycardia, orthostasis  - TTE is largely unremarkable  - Tachycardia noted; Will continue to monitor | will start her gentle fluid after Chest xray was viewed. Potentially due to dehydration???  - Agree with GI to C/w full A/C; recommend restarting Eliquis 5 mg PO BID     Psych: Panic attack/Depression  - likely some of tachycardia might be related to anxiety/potentially panic attack; consider Psych consult  - Patient expressing she is depressed and anxious   - Recommend Lexapro 10 mg PO Qd and Xanax 0.25 mg PO PRN for anxiety     Wound: Pressure Ulcers- Buttocks  -Left buttock stage 2- barrier cream and allevyn  -Right buttock stage 4- aquacell packing and allevyn dressing    DVT PPX:   - SCDs

## 2020-06-14 NOTE — PROGRESS NOTE ADULT - SUBJECTIVE AND OBJECTIVE BOX
SUBJECTIVE & OVERNIGHT EVENTS:  Patient seen and examined.  No acute events overnight.  Patient in bed in NAD, no SOB, no n/v, pain controlled    [ ] Constitutional +fatigue        [] Cardio +palpitations              [] Resp +PETERSON  [X] GI WNL                            [X]  WNL                    [ ]Heme WNL  [ ] Endo WNL                       [ ] Skin WNL                  [X] MSK WNL  [X] Neuro WNL                     [X] Cognitive WNL         [X] Psych WN    Vital Signs Last 24 Hrs  T(C): 36.8 (13 Jun 2020 21:15), Max: 36.8 (13 Jun 2020 21:15)  T(F): 98.2 (13 Jun 2020 21:15), Max: 98.2 (13 Jun 2020 21:15)  HR: 112 (13 Jun 2020 21:15) (110 - 112)  BP: 106/71 (13 Jun 2020 21:15) (106/71 - 112/69)  BP(mean): --  RR: 14 (13 Jun 2020 21:15) (14 - 15)  SpO2: 99% (13 Jun 2020 21:15) (98% - 99%)      PHYSICAL EXAM   Constitutional: NAD  HEENT: NCAT, handling secretions well  Cardio: well-perfused  Resp: symmetric chest-rise, no increased WOB  GI: non-distended  : no downey  Extremities: well-perfused  Skin:   Wound on sacrum with minimal slough and no drainage.  Motor:  BLE: 4+-5/5mp      LABS:                          9.2    9.36  )-----------( 493      ( 12 Jun 2020 05:30 )             28.1           MEDICATIONS  (STANDING):  ammonium lactate 12% Lotion 1 Application(s) Topical two times a day  ascorbic acid 500 milliGRAM(s) Oral two times a day  atorvastatin 20 milliGRAM(s) Oral at bedtime  calcium carbonate   1250 mG (OsCal) 1 Tablet(s) Oral daily  ferrous    sulfate 325 milliGRAM(s) Oral daily  gabapentin 100 milliGRAM(s) Oral at bedtime  heparin   Injectable 5000 Unit(s) SubCutaneous every 12 hours  multivitamin 1 Tablet(s) Oral daily  pantoprazole    Tablet 40 milliGRAM(s) Oral two times a day  polyethylene glycol 3350 17 Gram(s) Oral daily  sodium chloride 0.9%. 500 milliLiter(s) (500 mL/Hr) IV Continuous <Continuous>  zinc sulfate 220 milliGRAM(s) Oral daily    MEDICATIONS  (PRN):  acetaminophen   Tablet .. 650 milliGRAM(s) Oral every 6 hours PRN Temp greater or equal to 38C (100.4F), Mild Pain (1 - 3)  aluminum hydroxide/magnesium hydroxide/simethicone Suspension 30 milliLiter(s) Oral every 6 hours PRN Dyspepsia  artificial  tears Solution 1 Drop(s) Both EYES every 2 hours PRN Dry Eyes  senna 2 Tablet(s) Oral at bedtime PRN Constipation  simethicone 80 milliGRAM(s) Chew three times a day PRN Gas

## 2020-06-15 ENCOUNTER — TRANSCRIPTION ENCOUNTER (OUTPATIENT)
Age: 50
End: 2020-06-15

## 2020-06-15 LAB
ALBUMIN SERPL ELPH-MCNC: 3.2 G/DL — LOW (ref 3.3–5)
ALP SERPL-CCNC: 82 U/L — SIGNIFICANT CHANGE UP (ref 40–120)
ALT FLD-CCNC: 64 U/L — HIGH (ref 10–45)
ANION GAP SERPL CALC-SCNC: 13 MMOL/L — SIGNIFICANT CHANGE UP (ref 5–17)
ANISOCYTOSIS BLD QL: SLIGHT — SIGNIFICANT CHANGE UP
APPEARANCE UR: CLEAR — SIGNIFICANT CHANGE UP
AST SERPL-CCNC: 43 U/L — HIGH (ref 10–40)
BASOPHILS # BLD AUTO: 0.13 K/UL — SIGNIFICANT CHANGE UP (ref 0–0.2)
BASOPHILS NFR BLD AUTO: 1 % — SIGNIFICANT CHANGE UP (ref 0–2)
BILIRUB SERPL-MCNC: 0.4 MG/DL — SIGNIFICANT CHANGE UP (ref 0.2–1.2)
BILIRUB UR-MCNC: NEGATIVE — SIGNIFICANT CHANGE UP
BUN SERPL-MCNC: 34 MG/DL — HIGH (ref 7–23)
CALCIUM SERPL-MCNC: 9.3 MG/DL — SIGNIFICANT CHANGE UP (ref 8.4–10.5)
CHLORIDE SERPL-SCNC: 103 MMOL/L — SIGNIFICANT CHANGE UP (ref 96–108)
CO2 SERPL-SCNC: 25 MMOL/L — SIGNIFICANT CHANGE UP (ref 22–31)
COLOR SPEC: YELLOW — SIGNIFICANT CHANGE UP
CREAT SERPL-MCNC: 1.08 MG/DL — SIGNIFICANT CHANGE UP (ref 0.5–1.3)
DACRYOCYTES BLD QL SMEAR: SLIGHT — SIGNIFICANT CHANGE UP
DIFF PNL FLD: NEGATIVE — SIGNIFICANT CHANGE UP
EOSINOPHIL # BLD AUTO: 0.53 K/UL — HIGH (ref 0–0.5)
EOSINOPHIL NFR BLD AUTO: 4 % — SIGNIFICANT CHANGE UP (ref 0–6)
GLUCOSE SERPL-MCNC: 128 MG/DL — HIGH (ref 70–99)
GLUCOSE UR QL: NEGATIVE — SIGNIFICANT CHANGE UP
HCT VFR BLD CALC: 30.3 % — LOW (ref 34.5–45)
HGB BLD-MCNC: 9.7 G/DL — LOW (ref 11.5–15.5)
KETONES UR-MCNC: NEGATIVE — SIGNIFICANT CHANGE UP
LEUKOCYTE ESTERASE UR-ACNC: NEGATIVE — SIGNIFICANT CHANGE UP
LYMPHOCYTES # BLD AUTO: 41 % — SIGNIFICANT CHANGE UP (ref 13–44)
LYMPHOCYTES # BLD AUTO: 5.38 K/UL — HIGH (ref 1–3.3)
MANUAL SMEAR VERIFICATION: SIGNIFICANT CHANGE UP
MCHC RBC-ENTMCNC: 30.3 PG — SIGNIFICANT CHANGE UP (ref 27–34)
MCHC RBC-ENTMCNC: 32 GM/DL — SIGNIFICANT CHANGE UP (ref 32–36)
MCV RBC AUTO: 94.7 FL — SIGNIFICANT CHANGE UP (ref 80–100)
MONOCYTES # BLD AUTO: 1.05 K/UL — HIGH (ref 0–0.9)
MONOCYTES NFR BLD AUTO: 8 % — SIGNIFICANT CHANGE UP (ref 2–14)
MYELOCYTES NFR BLD: 2 % — HIGH (ref 0–0)
NEUTROPHILS # BLD AUTO: 5.12 K/UL — SIGNIFICANT CHANGE UP (ref 1.8–7.4)
NEUTROPHILS NFR BLD AUTO: 37 % — LOW (ref 43–77)
NEUTS BAND # BLD: 2 % — SIGNIFICANT CHANGE UP (ref 0–8)
NITRITE UR-MCNC: NEGATIVE — SIGNIFICANT CHANGE UP
NRBC # BLD: 0 /100 — SIGNIFICANT CHANGE UP (ref 0–0)
PH UR: 6.5 — SIGNIFICANT CHANGE UP (ref 5–8)
PLAT MORPH BLD: NORMAL — SIGNIFICANT CHANGE UP
PLATELET # BLD AUTO: 561 K/UL — HIGH (ref 150–400)
POIKILOCYTOSIS BLD QL AUTO: SLIGHT — SIGNIFICANT CHANGE UP
POTASSIUM SERPL-MCNC: 2.8 MMOL/L — CRITICAL LOW (ref 3.5–5.3)
POTASSIUM SERPL-SCNC: 2.8 MMOL/L — CRITICAL LOW (ref 3.5–5.3)
PROT SERPL-MCNC: 7.2 G/DL — SIGNIFICANT CHANGE UP (ref 6–8.3)
PROT UR-MCNC: NEGATIVE — SIGNIFICANT CHANGE UP
RBC # BLD: 3.2 M/UL — LOW (ref 3.8–5.2)
RBC # FLD: 16.3 % — HIGH (ref 10.3–14.5)
RBC BLD AUTO: ABNORMAL
ROULEAUX BLD QL SMEAR: PRESENT
SMUDGE CELLS # BLD: PRESENT — SIGNIFICANT CHANGE UP
SODIUM SERPL-SCNC: 141 MMOL/L — SIGNIFICANT CHANGE UP (ref 135–145)
SP GR SPEC: 1.01 — SIGNIFICANT CHANGE UP (ref 1.01–1.02)
UROBILINOGEN FLD QL: NEGATIVE — SIGNIFICANT CHANGE UP
VARIANT LYMPHS # BLD: 5 % — SIGNIFICANT CHANGE UP (ref 0–6)
WBC # BLD: 13.13 K/UL — HIGH (ref 3.8–10.5)
WBC # FLD AUTO: 13.13 K/UL — HIGH (ref 3.8–10.5)

## 2020-06-15 PROCEDURE — 99232 SBSQ HOSP IP/OBS MODERATE 35: CPT

## 2020-06-15 PROCEDURE — 99233 SBSQ HOSP IP/OBS HIGH 50: CPT

## 2020-06-15 RX ORDER — ALPRAZOLAM 0.25 MG
0.25 TABLET ORAL EVERY 6 HOURS
Refills: 0 | Status: DISCONTINUED | OUTPATIENT
Start: 2020-06-15 | End: 2020-06-18

## 2020-06-15 RX ORDER — ESCITALOPRAM OXALATE 10 MG/1
10 TABLET, FILM COATED ORAL DAILY
Refills: 0 | Status: DISCONTINUED | OUTPATIENT
Start: 2020-06-15 | End: 2020-06-16

## 2020-06-15 RX ORDER — POTASSIUM CHLORIDE 20 MEQ
40 PACKET (EA) ORAL EVERY 4 HOURS
Refills: 0 | Status: COMPLETED | OUTPATIENT
Start: 2020-06-15 | End: 2020-06-15

## 2020-06-15 RX ADMIN — SIMETHICONE 80 MILLIGRAM(S): 80 TABLET, CHEWABLE ORAL at 20:09

## 2020-06-15 RX ADMIN — ZINC SULFATE TAB 220 MG (50 MG ZINC EQUIVALENT) 220 MILLIGRAM(S): 220 (50 ZN) TAB at 17:36

## 2020-06-15 RX ADMIN — POLYETHYLENE GLYCOL 3350 17 GRAM(S): 17 POWDER, FOR SOLUTION ORAL at 11:58

## 2020-06-15 RX ADMIN — Medication 40 MILLIEQUIVALENT(S): at 10:29

## 2020-06-15 RX ADMIN — Medication 1 TABLET(S): at 11:58

## 2020-06-15 RX ADMIN — PANTOPRAZOLE SODIUM 40 MILLIGRAM(S): 20 TABLET, DELAYED RELEASE ORAL at 17:36

## 2020-06-15 RX ADMIN — Medication 40 MILLIEQUIVALENT(S): at 18:41

## 2020-06-15 RX ADMIN — Medication 1 APPLICATION(S): at 06:05

## 2020-06-15 RX ADMIN — HEPARIN SODIUM 5000 UNIT(S): 5000 INJECTION INTRAVENOUS; SUBCUTANEOUS at 06:07

## 2020-06-15 RX ADMIN — HEPARIN SODIUM 5000 UNIT(S): 5000 INJECTION INTRAVENOUS; SUBCUTANEOUS at 17:37

## 2020-06-15 RX ADMIN — ESCITALOPRAM OXALATE 10 MILLIGRAM(S): 10 TABLET, FILM COATED ORAL at 17:36

## 2020-06-15 RX ADMIN — Medication 325 MILLIGRAM(S): at 11:58

## 2020-06-15 RX ADMIN — GABAPENTIN 100 MILLIGRAM(S): 400 CAPSULE ORAL at 22:08

## 2020-06-15 RX ADMIN — ATORVASTATIN CALCIUM 20 MILLIGRAM(S): 80 TABLET, FILM COATED ORAL at 22:08

## 2020-06-15 RX ADMIN — PANTOPRAZOLE SODIUM 40 MILLIGRAM(S): 20 TABLET, DELAYED RELEASE ORAL at 06:07

## 2020-06-15 RX ADMIN — Medication 500 MILLIGRAM(S): at 06:06

## 2020-06-15 RX ADMIN — Medication 500 MILLIGRAM(S): at 17:36

## 2020-06-15 NOTE — PROGRESS NOTE ADULT - ASSESSMENT
51 y/o woman with no significant PMH hospitalized with COVID s/p intubation, trach and decannulation, readmitted for chest tightness, and possibly pleuritis now admiited to [facility] for gait Instability, ADL impairments and Functional impairments.    Normocytic anemia  -Patient reports dark stool, could me melena vs effect of oral iron  -FOBT positive, reticulocyte % elevated, BUN elevated  -S/p 1 unit PRBC 6/9  -H/H stable   -Resume anticoagulation  -C/w PPI for prophylaxis for GI bleed  -Monitor CBC, transfuse as needed for symptomatic anemia or Hbg<7    Hypokalemia  -Potassium 2.8 today  -Replete with KCl 40 meq X 2  -Check Mg level  -Monitor electrolyes    Scalp pain  -Likely allodynia, per rehab team  -C/w gabapentin  -Will monitor clinical response    Subclinical hypothyroidism  -TSH is mildly elevated   -FT4 is normal  -Therapy is not indicated at this time, could worsen tachycardia    Sinus tachycardia, orthostasis  -TTE is largely unremarkable  -Continue to hold beta agonists  -Will continue to monitor    Possible anxiety or depression disorder  -Patient appears anxious   -Evaluated by psychology, recommended to start antidepressant and anxiolytic medication  -Would consult psychiatry for evaluation    Functional and gait instability:  - C/w PT/OT per primary team .    Pressure Ulcers- Buttocks  -Left buttock stage 2- barrier cream and allevyn  -Right buttock stage 4- aquacell packing and allevyn dressing    GERD  - C/w Protonix 40 mg PO QD     DVT PPX:   - SCDs 51 y/o woman with no significant PMH hospitalized with COVID s/p intubation, trach and decannulation, readmitted for chest tightness, and possibly pleuritis now admiited to [facility] for gait Instability, ADL impairments and Functional impairments.    Normocytic anemia  -Patient reports dark stool, could me melena vs effect of oral iron  -FOBT positive, reticulocyte % elevated, BUN elevated  -S/p 1 unit PRBC 6/9  -H/H stable   -Resume anticoagulation  -C/w PPI for prophylaxis for GI bleed  -Monitor CBC, transfuse as needed for symptomatic anemia or Hbg<7    Leukocytosis  -Mild  -Afebrile  -UA negative  -Check CXR  -Monitor off antibiotics    Hypokalemia  -Potassium 2.8 today  -Replete with KCl 40 meq X 2  -Check Mg level  -Monitor electrolyes    Scalp pain  -Likely allodynia, per rehab team  -C/w gabapentin  -Will monitor clinical response    Subclinical hypothyroidism  -TSH is mildly elevated   -FT4 is normal  -Therapy is not indicated at this time, could worsen tachycardia    Sinus tachycardia, orthostasis  -TTE is largely unremarkable  -Continue to hold beta agonists  -Will continue to monitor    Possible anxiety or depression disorder  -Patient appears anxious   -Evaluated by psychology, recommended to start antidepressant and anxiolytic medication  -Would consult psychiatry for evaluation    Functional and gait instability:  - C/w PT/OT per primary team .    Pressure Ulcers- Buttocks  -Left buttock stage 2- barrier cream and allevyn  -Right buttock stage 4- aquacell packing and allevyn dressing    GERD  - C/w Protonix 40 mg PO QD     DVT PPX:   - SCDs 49 y/o woman with no significant PMH hospitalized with COVID s/p intubation, trach and decannulation, readmitted for chest tightness, and possibly pleuritis now admiited to [facility] for gait Instability, ADL impairments and Functional impairments.    Normocytic anemia  -Patient reports dark stool, could me melena vs effect of oral iron  -FOBT positive, reticulocyte % elevated, BUN elevated  -S/p 1 unit PRBC 6/9  -H/H stable   -Resume anticoagulation  -C/w PPI for prophylaxis for GI bleed  -Monitor CBC, transfuse as needed for symptomatic anemia or Hbg<7    Leukocytosis  -Mild  -Afebrile  -UA negative  -CXR negative for new infiltrate  -Monitor off antibiotics    Hypokalemia  -Potassium 2.8 today  -Replete with KCl 40 meq X 2  -Check Mg level  -Monitor electrolyes    Scalp pain  -Likely allodynia, per rehab team  -C/w gabapentin  -Will monitor clinical response    Subclinical hypothyroidism  -TSH is mildly elevated   -FT4 is normal  -Therapy is not indicated at this time, could worsen tachycardia    Sinus tachycardia, orthostasis  -TTE is largely unremarkable  -Continue to hold beta agonists  -Will continue to monitor    Possible anxiety or depression disorder  -Patient appears anxious   -Evaluated by psychology, recommended to start antidepressant and anxiolytic medication  -Would consult psychiatry for evaluation    Functional and gait instability:  - C/w PT/OT per primary team .    Pressure Ulcers- Buttocks  -Left buttock stage 2- barrier cream and allevyn  -Right buttock stage 4- aquacell packing and allevyn dressing    GERD  - C/w Protonix 40 mg PO QD     DVT PPX:   - SCDs

## 2020-06-15 NOTE — PROGRESS NOTE ADULT - ATTENDING COMMENTS
Patient seen and discussed with resident. Agree with above assessment and plan.
Patient seen and discussed with resident on 6/9/20, agree with above assessment and plan.
Patient seen and discussed with resident. Agree with above assessment and plan.

## 2020-06-15 NOTE — DISCHARGE NOTE PROVIDER - HOSPITAL COURSE
50 year old woman with no significant PMH who had recent admission for COVID respiratory failure s/p intubation and trach to vent, was on ventilator for 30 days and discharged to [facility] on 5/11/20 and then went home after one day of [facility] on Eliquis 5mg bid. Patient then presented to [facility] on 5/25/20 with chest tightness and left arm numbness, fevers, nausea, abdominal discomfort, and urinary discomfort. Patient had chest tightness, tachycardia, elevated troponin likely secondary to pleurisy from recent COVID infection. ACS, myocarditis/pericarditis unlikely, seen by Cardiology. Pulmonary embolus ruled out with CT chest. Patient also with stage 4 buttocks pressure injury. Gi consulted for dysphagia/pain with swallowing that GI attributed to recent decannulation, placed on puree/soft diet. Tachycardia during admission attributed to possible poor fluid intake. Pleuritic chest pain noted to improve. Completed course of ceftriaxone for UTI. Tested COVID negative on 5/2        Patient participated in daily therapies and made good functional gains.  Rehab course notable for anemia, managed with 1U PRBCs.  FOBT+; seen by GI, started on protonix.          Pt also with persistent scalp pain, seemingly positional, and attributed to neuropathy in the setting of recent acute illness.  Treated with tylenol and gabapentin with good result.          Anxiety and panic attacks managed with lexapro and xanax, PRN.          CXR 6/13 with slight scarring in right upper lung field.          Sacrul ulcer treated with daily hygiene, wet-to-dry dressings, nutritional optimization, pressure relief, and allevyn dressings.          TTE WNL on 6/7/20.              Patient seen and examined on day of discharge.  Medications, medication side effects, and discharge instructions were reviewed with the patient, who expressed understanding of all information.  Patient was medically and functionally optimized and cleared for discharge. 50 year old woman with no significant PMH who had recent admission for COVID respiratory failure s/p intubation and trach to vent, was on ventilator for 30 days and discharged to [facility] on 5/11/20 and then went home after one day of [facility] on Eliquis 5mg bid. Patient then presented to [facility] on 5/25/20 with chest tightness and left arm numbness, fevers, nausea, abdominal discomfort, and urinary discomfort. Patient had chest tightness, tachycardia, elevated troponin likely secondary to pleurisy from recent COVID infection. ACS, myocarditis/pericarditis unlikely, seen by Cardiology. Pulmonary embolus ruled out with CT chest. Patient also with stage 4 buttocks pressure injury. Gi consulted for dysphagia/pain with swallowing that GI attributed to recent decannulation, placed on puree/soft diet. Tachycardia during admission attributed to possible poor fluid intake. Pleuritic chest pain noted to improve. Completed course of ceftriaxone for UTI. Tested COVID negative on 5/2        Patient participated in daily therapies and made good functional gains.  Rehab course notable for anemia, managed with 1U PRBCs.  FOBT+; seen by GI, started on protonix BID.          Pt also with persistent scalp pain, seemingly positional, and attributed to neuropathy in the setting of recent acute illness.  Treated with tylenol and gabapentin with good result.          Anxiety and panic attacks managed with lexapro and xanax, PRN.          CXR 6/13 with slight scarring in right upper lung field.          Sacrul ulcer treated with daily hygiene, wet-to-dry dressings, nutritional optimization, pressure relief, and allevyn dressings.          TTE WNL on 6/7/20.              Patient seen and examined on day of discharge.  Medications, medication side effects, and discharge instructions were reviewed with the patient, who expressed understanding of all information.  Patient was medically and functionally optimized and cleared for discharge. 50 year old woman with no significant PMH who had recent admission for COVID respiratory failure s/p intubation and trach to vent, was on ventilator for 30 days and discharged to [facility] on 5/11/20 and then went home after one day of [facility] on Eliquis 5mg bid. Patient then presented to [facility] on 5/25/20 with chest tightness and left arm numbness, fevers, nausea, abdominal discomfort, and urinary discomfort. Patient had chest tightness, tachycardia, elevated troponin likely secondary to pleurisy from recent COVID infection. ACS, myocarditis/pericarditis unlikely, seen by Cardiology. Pulmonary embolus ruled out with CT chest. Patient also with stage 4 buttocks pressure injury. Gi consulted for dysphagia/pain with swallowing that GI attributed to recent decannulation, placed on puree/soft diet. Tachycardia during admission attributed to possible poor fluid intake. Pleuritic chest pain noted to improve. Completed course of ceftriaxone for UTI. Tested COVID negative on 5/2        Patient participated in daily therapies and made good functional gains.  Rehab course notable for anemia, managed with 1U PRBCs.  FOBT+; seen by GI, started on protonix BID.          Pt also with persistent scalp pain, seemingly positional, and attributed to neuropathy in the setting of recent acute illness.  Treated with tylenol and gabapentin with good result.          Anxiety and panic attacks managed with lexapro and xanax, PRN.          CXR 6/13 with slight scarring in right upper lung field.          Sacrul ulcer treated with BID wound care, wet-to-dry dressings, nutritional optimization, pressure relief.        TTE WNL on 6/7/20.              Patient seen and examined on day of discharge.  Medications, medication side effects, and discharge instructions were reviewed with the patient, who expressed understanding of all information.  Patient was medically and functionally optimized and cleared for discharge.

## 2020-06-15 NOTE — DISCHARGE NOTE PROVIDER - NSDCFUADDAPPT_GEN_ALL_CORE_FT
Follow up with:  -your primary care doctor  -gastroenterologist  -psychologist or other therapist  -pulmonologist Follow up with:  -your primary care doctor  -gastroenterologist  -psychologist or other therapist  -pulmonologist  -ask primary care doctor for referral to wound care doctor

## 2020-06-15 NOTE — DISCHARGE NOTE PROVIDER - CARE PROVIDER_API CALL
JACI YOUNG  GASTROENTEROLOGY  [address] [place] [address]  Phone: [phone]  Fax: [phone]  Follow Up Time: 2 weeks

## 2020-06-15 NOTE — DISCHARGE NOTE PROVIDER - NSDCCPCAREPLAN_GEN_ALL_CORE_FT
PRINCIPAL DISCHARGE DIAGNOSIS  Diagnosis: COVID-19  Assessment and Plan of Treatment: You were admitted to rehab because you were very weak after being diagnosed with COVID19.   Please continue to take medications and participate in therapies as instructed.  Follow up with your primary care doctor and pulmonologist within 1-2 weeks of discharge from [facility].  If you do not have a primary care doctor or pulmonologist, you can call your insurance company for a list of doctors nearby.  Be sure to bring your hospital discharge paperwork when you attend these appointments.      SECONDARY DISCHARGE DIAGNOSES  Diagnosis: Anemia  Assessment and Plan of Treatment: You were found to have low hemoglobin in your blood during your stay at rehab.  You received a blood transfusion.  You were also noted to have some blood in your bowel movement, so you were started on pantoprazole two times a day.  Follow up with your primary care doctor and gastroenterologist within 1-2 weeks of discharge from rehab.    Diagnosis: GI bleed  Assessment and Plan of Treatment: You were found to have low hemoglobin in your blood during your stay at rehab.  You received a blood transfusion.  You were also noted to have some blood in your bowel movement, so you were started on pantoprazole two times a day.  Follow up with your primary care doctor and gastroenterologist within 1-2 weeks of discharge from rehab.    Diagnosis: Anxiety  Assessment and Plan of Treatment: Continue medications as instructed.  Follow up with your primary care doctor within 1-2 weeks of discharge from rehab.  It may also be a good idea to find a therapist (psychologist) to help you work through some of your feelings surrouding your recent illness. PRINCIPAL DISCHARGE DIAGNOSIS  Diagnosis: COVID-19  Assessment and Plan of Treatment: You were admitted to rehab because you were very weak after being diagnosed with COVID19.   Please continue to take medications and participate in therapies as instructed.  Follow up with your primary care doctor and pulmonologist within 1-2 weeks of discharge from [facility].  If you do not have a primary care doctor or pulmonologist, you can call your insurance company for a list of doctors nearby.  Be sure to bring your hospital discharge paperwork when you attend these appointments.      SECONDARY DISCHARGE DIAGNOSES  Diagnosis: Pressure ulcer of sacrum  Assessment and Plan of Treatment: Healing stage 4 presusre wound   Continue local care daily: cleanse wound with NS, pat dry with sterile gauze, apply cavilon to the periwound, apply woundgel to the wound base, pack wound with saline moistened sterile gauze and cover with dry sterile dressing.    Diagnosis: Anemia  Assessment and Plan of Treatment: You were found to have low hemoglobin in your blood during your stay at rehab.  You received a blood transfusion.  You were also noted to have some blood in your bowel movement, so you were started on pantoprazole two times a day.  Follow up with your primary care doctor and gastroenterologist within 1-2 weeks of discharge from rehab.    Diagnosis: GI bleed  Assessment and Plan of Treatment: You were found to have low hemoglobin in your blood during your stay at rehab.  You received a blood transfusion.  You were also noted to have some blood in your bowel movement, so you were started on pantoprazole two times a day.  Follow up with your primary care doctor and gastroenterologist within 1-2 weeks of discharge from rehab.    Diagnosis: Anxiety  Assessment and Plan of Treatment: Continue medications as instructed.  Follow up with your primary care doctor within 1-2 weeks of discharge from rehab.  It may also be a good idea to find a therapist (psychologist) to help you work through some of your feelings surrouding your recent illness.

## 2020-06-15 NOTE — PROGRESS NOTE ADULT - ASSESSMENT
ASSESSMENT/PLAN:     50/F with no significant PMH hospitalized with COVID s/p intubation, trach and decannulation with Gait Instability, ADL impairments and Functional impairments.    COMORBIDITES/ACTIVE MEDICAL ISSUES     #Post-COVID debility  -start Comprehensive Rehab Program of PT/OT/SLP  -Eliquis 5mg bid for post COVID ppx- changed to heparin due to positive FOBT  -stopped albuterol for tachycardia/hyopkalemia   -start pulm rehab     #Nocturnal Dyspnea  -check overnight pulse ox    #Orthostasis/Hypotension/Tachycardia  -may be due to symptomatic anemia  -1U PRBCs on 6/9  -H/H stable since 6/9  -FOBT positive: GI recommend monitor for now. increase PPI to BID for GERD.   -EKG and echocardiogram reviewed. EF 50-60%.     #Scalp pain  -likely allodynic   -low-dose gabapentin at night     #Hypokalemia  -replete PRN, routine monitoring    #HLD  -c/w statin    #Healing ulcer to sacral region   -Per chart, wound was staged as 4 initially. As of 6/9, stage 3.   -Continue wet to dry dressing, cavilon to periwound, loosely packed with wet to dry dressing. Monitor signs of healing, may benefit from WVAC.   -Add MVI, vit c BID, and zinc x 9 days (limit use of zinc to avoid copper deficiency).  -Roho cushion while in wc.   -Turn and reposition at minimum of q2hrs to offload pressure.   -Continue sol and ensure.        Pain Mgmt - Tylenol PRN  GI/Bowel Mgmt -  Continent c/w Senna prn; dulcolax supp prn; c/w pantoprazole  /Bladder Mgmt - Continent, PVR    FEN   - Diet - Puree/Soft   - Dysphagia  SLP - evaluation and treatment      Precautions / PROPHYLAXIS:   - Falls, Cardiac, Sternal, Spinal, Seizure   - ortho: Weight bearing status: WBAT   - Lungs: Aspiration, Incentive Spirometer   - Pressure injury/Skin: Turn Q2hrs while in bed, OOB to Chair, PT/OT    - DVT: Eliquis for COVID prophylaxis changed to heparin due to positive FOBT.         ANTICIPATED DISCHARGE: 6/18 home ASSESSMENT/PLAN:     50/F with no significant PMH hospitalized with COVID s/p intubation, trach and decannulation with Gait Instability, ADL impairments and Functional impairments.    COMORBIDITES/ACTIVE MEDICAL ISSUES     #Post-COVID debility  -start Comprehensive Rehab Program of PT/OT/SLP  -Eliquis 5mg bid for post COVID ppx- changed to heparin due to positive FOBT  -consider restarting eliquis, per hospitalist   -stopped albuterol for tachycardia/hyopkalemia   -start pulm rehab     #Nocturnal Dyspnea  -checked overnight pulse ox  -awaiting official read, but preliminarily it does not appear that patient desaturated significantly overnight     #Anxiety  -continue xanax, lexapro    #Orthostasis/Hypotension/Tachycardia  -may be due to symptomatic anemia  -1U PRBCs on 6/9  -H/H stable since 6/9  -FOBT positive: GI recommend monitor for now. increase PPI to BID for GERD.   -EKG and echocardiogram reviewed. EF 50-60%.     #Scalp pain  -likely allodynic   -low-dose gabapentin at night     #Hypokalemia  -replete PRN, routine monitoring    #HLD  -c/w statin    #Healing ulcer to sacral region   -Per chart, wound was staged as 4 initially. As of 6/9, stage 3.   -Continue wet to dry dressing, cavilon to periwound, loosely packed with wet to dry dressing. Monitor signs of healing, may benefit from WVAC.   -Add MVI, vit c BID, and zinc x 9 days (limit use of zinc to avoid copper deficiency).  -Roho cushion while in wc.   -Turn and reposition at minimum of q2hrs to offload pressure.   -Continue sol and ensure.        Pain Mgmt - Tylenol PRN  GI/Bowel Mgmt -  Continent c/w Senna prn; dulcolax supp prn; c/w pantoprazole  /Bladder Mgmt - Continent, PVR    FEN   - Diet - Puree/Soft   - Dysphagia  SLP - evaluation and treatment      Precautions / PROPHYLAXIS:   - Falls, Cardiac, Sternal, Spinal, Seizure   - ortho: Weight bearing status: WBAT   - Lungs: Aspiration, Incentive Spirometer   - Pressure injury/Skin: Turn Q2hrs while in bed, OOB to Chair, PT/OT    - DVT: Eliquis for COVID prophylaxis changed to heparin due to positive FOBT.         ANTICIPATED DISCHARGE: 6/18 home ASSESSMENT/PLAN:     50/F with no significant PMH hospitalized with COVID s/p intubation, trach and decannulation with Gait Instability, ADL impairments and Functional impairments.    COMORBIDITES/ACTIVE MEDICAL ISSUES     #Post-COVID debility  -start Comprehensive Rehab Program of PT/OT/SLP  -Eliquis 5mg bid for post COVID ppx- changed to heparin due to positive FOBT  -consider restarting eliquis, per hospitalist   -stopped albuterol for tachycardia/hyopkalemia   -start pulm rehab     #Leukocytosis  -slight on 6/15  -UA negative  -CXR 6/13 with "slight scarring in right upper lung field"  -trend 6/16    #Nocturnal Dyspnea  -checked overnight pulse ox  -awaiting official read, but preliminarily it does not appear that patient desaturated significantly overnight     #Anxiety  -continue xanax, lexapro    #Orthostasis/Hypotension/Tachycardia  -may be due to symptomatic anemia  -1U PRBCs on 6/9  -H/H stable since 6/9  -FOBT positive: GI recommend monitor for now. increase PPI to BID for GERD.   -EKG and echocardiogram reviewed. EF 50-60%.     #Scalp pain  -likely allodynic   -low-dose gabapentin at night     #Hypokalemia  -replete PRN, routine monitoring    #HLD  -c/w statin    #Healing ulcer to sacral region   -Per chart, wound was staged as 4 initially. As of 6/9, stage 3.   -Continue wet to dry dressing, cavilon to periwound, loosely packed with wet to dry dressing. Monitor signs of healing, may benefit from WVAC.   -Add MVI, vit c BID, and zinc x 9 days (limit use of zinc to avoid copper deficiency).  -Roho cushion while in wc.   -Turn and reposition at minimum of q2hrs to offload pressure.   -Continue sol and ensure.        Pain Mgmt - Tylenol PRN  GI/Bowel Mgmt -  Continent c/w Senna prn; dulcolax supp prn; c/w pantoprazole  /Bladder Mgmt - Continent, PVR    FEN   - Diet - Puree/Soft   - Dysphagia  SLP - evaluation and treatment      Precautions / PROPHYLAXIS:   - Falls, Cardiac, Sternal, Spinal, Seizure   - ortho: Weight bearing status: WBAT   - Lungs: Aspiration, Incentive Spirometer   - Pressure injury/Skin: Turn Q2hrs while in bed, OOB to Chair, PT/OT    - DVT: Eliquis for COVID prophylaxis changed to heparin due to positive FOBT.         ANTICIPATED DISCHARGE: 6/18 home

## 2020-06-15 NOTE — PROGRESS NOTE ADULT - SUBJECTIVE AND OBJECTIVE BOX
SUBJECTIVE & OVERNIGHT EVENTS:  Patient seen and examined.  No acute events overnight.   Doing well overall.     [ ] Constitutional +fatigue        [] Cardio +palpitations              [] Resp +PETERSON  [X] GI WNL                            [X]  WNL                    [X] Heme WNL  [X] Endo WNL                       [X] Skin WNL                  [X] MSK WNL  [X] Neuro WNL                     [X] Cognitive WNL         [X] Psych WN    Vital Signs Last 24 Hrs  T(C): 36.8 (12 Jun 2020 08:36), Max: 36.9 (11 Jun 2020 21:52)  T(F): 98.3 (12 Jun 2020 08:36), Max: 98.4 (11 Jun 2020 21:52)  HR: 126 (12 Jun 2020 08:36) (97 - 126)  BP: 95/68 (12 Jun 2020 08:36) (95/68 - 108/64)  BP(mean): --  RR: 14 (12 Jun 2020 08:36) (14 - 14)  SpO2: 99% (12 Jun 2020 08:36) (97% - 99%)      PHYSICAL EXAM   Constitutional: NAD  HEENT: NCAT, EOMI, handling secretions well  Cardio: well-perfused  Resp: symmetric chest-rise, no increased WOB  GI: non-distended  : no downey  Extremities: well-perfused  Skin:   Wound on sacrum with minimal slough and no drainage.  Healthy granulation tissue visible.  No bone/muscle visible. Some adipose tissue visible.   1.6cm depth in center  Tunneling from 12-2 o'clock to depth of 2.5cm  Vertical length 5.8cm  Horizontal length 2.5 cm      LABS:                          9.2    9.36  )-----------( 493      ( 12 Jun 2020 05:30 )             28.1           MEDICATIONS  (STANDING):  ammonium lactate 12% Lotion 1 Application(s) Topical two times a day  ascorbic acid 500 milliGRAM(s) Oral two times a day  atorvastatin 20 milliGRAM(s) Oral at bedtime  calcium carbonate   1250 mG (OsCal) 1 Tablet(s) Oral daily  ferrous    sulfate 325 milliGRAM(s) Oral daily  gabapentin 100 milliGRAM(s) Oral at bedtime  heparin   Injectable 5000 Unit(s) SubCutaneous every 12 hours  multivitamin 1 Tablet(s) Oral daily  pantoprazole    Tablet 40 milliGRAM(s) Oral two times a day  polyethylene glycol 3350 17 Gram(s) Oral daily  sodium chloride 0.9%. 500 milliLiter(s) (500 mL/Hr) IV Continuous <Continuous>  zinc sulfate 220 milliGRAM(s) Oral daily    MEDICATIONS  (PRN):  acetaminophen   Tablet .. 650 milliGRAM(s) Oral every 6 hours PRN Temp greater or equal to 38C (100.4F), Mild Pain (1 - 3)  aluminum hydroxide/magnesium hydroxide/simethicone Suspension 30 milliLiter(s) Oral every 6 hours PRN Dyspepsia  artificial  tears Solution 1 Drop(s) Both EYES every 2 hours PRN Dry Eyes  senna 2 Tablet(s) Oral at bedtime PRN Constipation  simethicone 80 milliGRAM(s) Chew three times a day PRN Gas SUBJECTIVE & OVERNIGHT EVENTS:  Patient seen and examined.  No acute events overnight.  Observed participating well in therapy.  Continues to note anxiety, deconditioning.  Had CXR 6/13 showing slight scarring to right upper lung field.  Started on xanax over the weekend; tolerating well.  Discussed option of starting lexapro for mood/anxiety management; patient is amenable.      [ ] Constitutional +fatigue        [] Cardio +palpitations              [] Resp +PETERSON  [X] GI WNL                            [X]  WNL                    [X] Heme WNL  [X] Endo WNL                       [X] Skin WNL                  [X] MSK WNL  [X] Neuro WNL                     [X] Cognitive WNL         [X] Psych WN      OBJECTIVE  Vital Signs Last 24 Hrs  T(C): 37 (15 Alex 2020 07:40), Max: 37 (15 Alex 2020 07:40)  T(F): 98.6 (15 Alex 2020 07:40), Max: 98.6 (15 Alex 2020 07:40)  HR: 115 (15 Alex 2020 07:40) (104 - 115)  BP: 113/77 (15 Alex 2020 07:40) (110/70 - 113/77)  RR: 18 (15 Alex 2020 07:40) (18 - 18)  SpO2: 99% (15 Alex 2020 07:40) (99% - 100%)      PHYSICAL EXAM   Constitutional: NAD  HEENT: NCAT, EOMI, handling secretions well  Cardio: well-perfused  Resp: symmetric chest-rise, no increased WOB  GI: non-distended  : no downey  Extremities: well-perfused  Skin:   Wound on sacrum with minimal slough and no drainage.  Healthy granulation tissue visible.  No bone/muscle visible. Some adipose tissue visible.   1.6cm depth in center  Tunneling from 12-2 o'clock to depth of 2.5cm  Vertical length 5.8cm  Horizontal length 2.5 cm      LABS:                            9.7    13.13 )-----------( 561      ( 15 Alex 2020 07:28 )             30.3     15 Alex 2020 07:28    141    |  103    |  34     ----------------------------<  128    2.8     |  25     |  1.08     Ca    9.3        15 Alex 2020 07:28    TPro  7.2    /  Alb  3.2    /  TBili  0.4    /  DBili  x      /  AST  43     /  ALT  64     /  AlkPhos  82     15 Jun 2020 07:28        MEDICATIONS  (STANDING):  ammonium lactate 12% Lotion 1 Application(s) Topical two times a day  ascorbic acid 500 milliGRAM(s) Oral two times a day  atorvastatin 20 milliGRAM(s) Oral at bedtime  calcium carbonate   1250 mG (OsCal) 1 Tablet(s) Oral daily  escitalopram 10 milliGRAM(s) Oral daily  ferrous    sulfate 325 milliGRAM(s) Oral daily  gabapentin 100 milliGRAM(s) Oral at bedtime  heparin   Injectable 5000 Unit(s) SubCutaneous every 12 hours  multivitamin 1 Tablet(s) Oral daily  pantoprazole    Tablet 40 milliGRAM(s) Oral two times a day  polyethylene glycol 3350 17 Gram(s) Oral daily  potassium chloride    Tablet ER 40 milliEquivalent(s) Oral every 4 hours  sodium chloride 0.9%. 500 milliLiter(s) (500 mL/Hr) IV Continuous <Continuous>  zinc sulfate 220 milliGRAM(s) Oral daily    MEDICATIONS  (PRN):  acetaminophen   Tablet .. 650 milliGRAM(s) Oral every 6 hours PRN Temp greater or equal to 38C (100.4F), Mild Pain (1 - 3)  ALPRAZolam 0.25 milliGRAM(s) Oral every 6 hours PRN Panic attack  aluminum hydroxide/magnesium hydroxide/simethicone Suspension 30 milliLiter(s) Oral every 6 hours PRN Dyspepsia  artificial  tears Solution 1 Drop(s) Both EYES every 2 hours PRN Dry Eyes  senna 2 Tablet(s) Oral at bedtime PRN Constipation  simethicone 80 milliGRAM(s) Chew three times a day PRN Gas

## 2020-06-15 NOTE — DISCHARGE NOTE PROVIDER - INSTRUCTIONS
Please resume your regular diet.  You should consume a diet that is high in vegetables, fruits, and whole grains.  Limit the amount of sugar and saturated fats you eat.  Drink plenty of fluids and avoid sugary beverages. Please resume your regular diet.  You should consume a diet that is high in vegetables, fruits, and whole grains.  Limit the amount of sugar and saturated fats you eat.  Drink plenty of fluids and avoid sugary beverages.    Recommend to continue sol and Ensure enlive 1-2 x a day.

## 2020-06-15 NOTE — DISCHARGE NOTE PROVIDER - NSDCMRMEDTOKEN_GEN_ALL_CORE_FT
acetaminophen 325 mg oral tablet: 2 tab(s) orally every 6 hours, As needed, Temp greater or equal to 38C (100.4F), Mild Pain (1 - 3)  ALPRAZolam 0.25 mg oral tablet: 1 tab(s) orally once a day, As Needed -Panic attack MDD:1 tablet  aluminum hydroxide-magnesium hydroxide 200 mg-200 mg/5 mL oral suspension: 30 milliliter(s) orally every 6 hours, As needed, Dyspepsia  ammonium lactate 12% topical lotion: 1 application topically 2 times a day  ascorbic acid 500 mg oral tablet: 1 tab(s) orally 2 times a day  atorvastatin 20 mg oral tablet: 1 tab(s) orally once a day (at bedtime)  calcium carbonate 1250 mg (500 mg elemental calcium) oral tablet: 1 tab(s) orally once a day  escitalopram 10 mg oral tablet: 1 tab(s) orally once a day (at bedtime)  gabapentin 100 mg oral capsule: 1 cap(s) orally once a day (at bedtime)  guaifenesin-dextromethorphan 100 mg-10 mg/5 mL oral liquid: 10 milliliter(s) orally every 4 hours, As needed, Cough  Multiple Vitamins oral tablet: 1 tab(s) orally once a day  pantoprazole 40 mg oral delayed release tablet: 1 tab(s) orally 2 times a day  polyethylene glycol 3350 oral powder for reconstitution: 17 gram(s) orally once a day  senna oral tablet: 2 tab(s) orally once a day (at bedtime), As needed, Constipation

## 2020-06-15 NOTE — PROGRESS NOTE ADULT - SUBJECTIVE AND OBJECTIVE BOX
Patient is a 50y old  Female who presents with a chief complaint of COVID Debility (2020 13:42)      Patient seen and examined at bedside. She complains of head pain last night when she is lying down. Denies any other complaints.    ALLERGIES:  No Known Allergies    MEDICATIONS  (STANDING):  ammonium lactate 12% Lotion 1 Application(s) Topical two times a day  ascorbic acid 500 milliGRAM(s) Oral two times a day  atorvastatin 20 milliGRAM(s) Oral at bedtime  calcium carbonate   1250 mG (OsCal) 1 Tablet(s) Oral daily  escitalopram 10 milliGRAM(s) Oral daily  ferrous    sulfate 325 milliGRAM(s) Oral daily  gabapentin 100 milliGRAM(s) Oral at bedtime  heparin   Injectable 5000 Unit(s) SubCutaneous every 12 hours  multivitamin 1 Tablet(s) Oral daily  pantoprazole    Tablet 40 milliGRAM(s) Oral two times a day  polyethylene glycol 3350 17 Gram(s) Oral daily  potassium chloride    Tablet ER 40 milliEquivalent(s) Oral every 4 hours  sodium chloride 0.9%. 500 milliLiter(s) (500 mL/Hr) IV Continuous <Continuous>  zinc sulfate 220 milliGRAM(s) Oral daily    MEDICATIONS  (PRN):  acetaminophen   Tablet .. 650 milliGRAM(s) Oral every 6 hours PRN Temp greater or equal to 38C (100.4F), Mild Pain (1 - 3)  ALPRAZolam 0.25 milliGRAM(s) Oral every 6 hours PRN Panic attack  aluminum hydroxide/magnesium hydroxide/simethicone Suspension 30 milliLiter(s) Oral every 6 hours PRN Dyspepsia  artificial  tears Solution 1 Drop(s) Both EYES every 2 hours PRN Dry Eyes  senna 2 Tablet(s) Oral at bedtime PRN Constipation  simethicone 80 milliGRAM(s) Chew three times a day PRN Gas    Vital Signs Last 24 Hrs  T(F): 98.6 (15 Alex 2020 07:40), Max: 98.6 (15 Alex 2020 07:40)  HR: 115 (15 Alex 2020 07:40) (104 - 115)  BP: 113/77 (15 Alex 2020 07:40) (110/70 - 113/77)  RR: 18 (15 Alex 2020 07:40) (18 - 18)  SpO2: 99% (15 Alex 2020 07:40) (99% - 100%)  I&O's Summary    2020 07:01  -  15 Alex 2020 07:00  --------------------------------------------------------  IN: 0 mL / OUT: 5 mL / NET: -5 mL        PHYSICAL EXAM:  General: NAD, A/O x 3, Appears anxious  ENT: MMM  Neck: Supple, No JVD  Lungs: Clear to auscultation bilaterally  Cardio: RRR, S1/S2, No murmurs  Abdomen: Soft, Nontender, Nondistended; Bowel sounds present  Extremities: No calf tenderness, No pitting edema  Skin: Wound on sacrum without drainage         LABS:                        9.7    13.13 )-----------( 561      ( 15 Alex 2020 07:28 )             30.3       06-15    141  |  103  |  34  ----------------------------<  128  2.8   |  25  |  1.08    Ca    9.3      15 Alex 2020 07:28    TPro  7.2  /  Alb  3.2  /  TBili  0.4  /  DBili  x   /  AST  43  /  ALT  64  /  AlkPhos  82  06-15     eGFR if Non African American: 60 mL/min/1.73M2 (06-15-20 @ 07:28)  eGFR if : 69 mL/min/1.73M2 (06-15-20 @ 07:28)                               Urinalysis Basic - ( 15 Alex 2020 10:40 )    Color: Yellow / Appearance: Clear / S.015 / pH: x  Gluc: x / Ketone: Negative  / Bili: Negative / Urobili: Negative   Blood: x / Protein: Negative / Nitrite: Negative   Leuk Esterase: Negative / RBC: x / WBC x   Sq Epi: x / Non Sq Epi: x / Bacteria: x          RADIOLOGY & ADDITIONAL TESTS:    Care Discussed with Consultants/Other Providers: Patient is a 50y old  Female who presents with a chief complaint of COVID Debility (2020 13:42)      Patient seen and examined at bedside. She complains of head pain last night when she is lying down. Also complains of cough, productive of clear sputum. Denies fever/chills. Denies shortness of breath.     ALLERGIES:  No Known Allergies    MEDICATIONS  (STANDING):  ammonium lactate 12% Lotion 1 Application(s) Topical two times a day  ascorbic acid 500 milliGRAM(s) Oral two times a day  atorvastatin 20 milliGRAM(s) Oral at bedtime  calcium carbonate   1250 mG (OsCal) 1 Tablet(s) Oral daily  escitalopram 10 milliGRAM(s) Oral daily  ferrous    sulfate 325 milliGRAM(s) Oral daily  gabapentin 100 milliGRAM(s) Oral at bedtime  heparin   Injectable 5000 Unit(s) SubCutaneous every 12 hours  multivitamin 1 Tablet(s) Oral daily  pantoprazole    Tablet 40 milliGRAM(s) Oral two times a day  polyethylene glycol 3350 17 Gram(s) Oral daily  potassium chloride    Tablet ER 40 milliEquivalent(s) Oral every 4 hours  sodium chloride 0.9%. 500 milliLiter(s) (500 mL/Hr) IV Continuous <Continuous>  zinc sulfate 220 milliGRAM(s) Oral daily    MEDICATIONS  (PRN):  acetaminophen   Tablet .. 650 milliGRAM(s) Oral every 6 hours PRN Temp greater or equal to 38C (100.4F), Mild Pain (1 - 3)  ALPRAZolam 0.25 milliGRAM(s) Oral every 6 hours PRN Panic attack  aluminum hydroxide/magnesium hydroxide/simethicone Suspension 30 milliLiter(s) Oral every 6 hours PRN Dyspepsia  artificial  tears Solution 1 Drop(s) Both EYES every 2 hours PRN Dry Eyes  senna 2 Tablet(s) Oral at bedtime PRN Constipation  simethicone 80 milliGRAM(s) Chew three times a day PRN Gas    Vital Signs Last 24 Hrs  T(F): 98.6 (15 Alex 2020 07:40), Max: 98.6 (15 Alex 2020 07:40)  HR: 115 (15 Alex 2020 07:40) (104 - 115)  BP: 113/77 (15 Alex 2020 07:40) (110/70 - 113/77)  RR: 18 (15 Alex 2020 07:40) (18 - 18)  SpO2: 99% (15 Alex 2020 07:40) (99% - 100%)  I&O's Summary    2020 07:01  -  15 Alex 2020 07:00  --------------------------------------------------------  IN: 0 mL / OUT: 5 mL / NET: -5 mL        PHYSICAL EXAM:  General: NAD, A/O x 3, Appears anxious  ENT: MMM  Neck: Supple, No JVD  Lungs: Clear to auscultation bilaterally  Cardio: RRR, S1/S2, No murmurs  Abdomen: Soft, Nontender, Nondistended; Bowel sounds present  Extremities: No calf tenderness, No pitting edema  Skin: Wound on sacrum without drainage         LABS:                        9.7    13.13 )-----------( 561      ( 15 Alex 2020 07:28 )             30.3       06-15    141  |  103  |  34  ----------------------------<  128  2.8   |  25  |  1.08    Ca    9.3      15 Alex 2020 07:28    TPro  7.2  /  Alb  3.2  /  TBili  0.4  /  DBili  x   /  AST  43  /  ALT  64  /  AlkPhos  82  06-15     eGFR if Non African American: 60 mL/min/1.73M2 (06-15-20 @ 07:28)  eGFR if : 69 mL/min/1.73M2 (06-15-20 @ 07:28)                               Urinalysis Basic - ( 15 Alex 2020 10:40 )    Color: Yellow / Appearance: Clear / S.015 / pH: x  Gluc: x / Ketone: Negative  / Bili: Negative / Urobili: Negative   Blood: x / Protein: Negative / Nitrite: Negative   Leuk Esterase: Negative / RBC: x / WBC x   Sq Epi: x / Non Sq Epi: x / Bacteria: x          RADIOLOGY & ADDITIONAL TESTS:    Care Discussed with Consultants/Other Providers:

## 2020-06-15 NOTE — DISCHARGE NOTE PROVIDER - NSDCFUSCHEDAPPT_GEN_ALL_CORE_FT
AYLIN PAREDES ; 07/15/2020 ; [facility] AYLIN PAREDES ; 07/15/2020 ; [facility] AYLIN PAREDES ; 07/15/2020 ; NPP [facility]

## 2020-06-16 LAB
ANION GAP SERPL CALC-SCNC: 9 MMOL/L — SIGNIFICANT CHANGE UP (ref 5–17)
BASOPHILS # BLD AUTO: 0.08 K/UL — SIGNIFICANT CHANGE UP (ref 0–0.2)
BASOPHILS NFR BLD AUTO: 0.9 % — SIGNIFICANT CHANGE UP (ref 0–2)
BUN SERPL-MCNC: 29 MG/DL — HIGH (ref 7–23)
CALCIUM SERPL-MCNC: 9 MG/DL — SIGNIFICANT CHANGE UP (ref 8.4–10.5)
CHLORIDE SERPL-SCNC: 106 MMOL/L — SIGNIFICANT CHANGE UP (ref 96–108)
CO2 SERPL-SCNC: 26 MMOL/L — SIGNIFICANT CHANGE UP (ref 22–31)
CREAT SERPL-MCNC: 0.99 MG/DL — SIGNIFICANT CHANGE UP (ref 0.5–1.3)
EOSINOPHIL # BLD AUTO: 0.51 K/UL — HIGH (ref 0–0.5)
EOSINOPHIL NFR BLD AUTO: 5.7 % — SIGNIFICANT CHANGE UP (ref 0–6)
GLUCOSE SERPL-MCNC: 99 MG/DL — SIGNIFICANT CHANGE UP (ref 70–99)
HCT VFR BLD CALC: 25.6 % — LOW (ref 34.5–45)
HGB BLD-MCNC: 8.5 G/DL — LOW (ref 11.5–15.5)
IMM GRANULOCYTES NFR BLD AUTO: 1.2 % — SIGNIFICANT CHANGE UP (ref 0–1.5)
LYMPHOCYTES # BLD AUTO: 4.59 K/UL — HIGH (ref 1–3.3)
LYMPHOCYTES # BLD AUTO: 51.6 % — HIGH (ref 13–44)
MCHC RBC-ENTMCNC: 31 PG — SIGNIFICANT CHANGE UP (ref 27–34)
MCHC RBC-ENTMCNC: 33.2 GM/DL — SIGNIFICANT CHANGE UP (ref 32–36)
MCV RBC AUTO: 93.4 FL — SIGNIFICANT CHANGE UP (ref 80–100)
MONOCYTES # BLD AUTO: 0.65 K/UL — SIGNIFICANT CHANGE UP (ref 0–0.9)
MONOCYTES NFR BLD AUTO: 7.3 % — SIGNIFICANT CHANGE UP (ref 2–14)
NEUTROPHILS # BLD AUTO: 2.95 K/UL — SIGNIFICANT CHANGE UP (ref 1.8–7.4)
NEUTROPHILS NFR BLD AUTO: 33.3 % — LOW (ref 43–77)
NRBC # BLD: 0 /100 WBCS — SIGNIFICANT CHANGE UP (ref 0–0)
PLATELET # BLD AUTO: 444 K/UL — HIGH (ref 150–400)
POTASSIUM SERPL-MCNC: 4.3 MMOL/L — SIGNIFICANT CHANGE UP (ref 3.5–5.3)
POTASSIUM SERPL-SCNC: 4.3 MMOL/L — SIGNIFICANT CHANGE UP (ref 3.5–5.3)
RBC # BLD: 2.74 M/UL — LOW (ref 3.8–5.2)
RBC # FLD: 16.3 % — HIGH (ref 10.3–14.5)
SODIUM SERPL-SCNC: 141 MMOL/L — SIGNIFICANT CHANGE UP (ref 135–145)
WBC # BLD: 8.89 K/UL — SIGNIFICANT CHANGE UP (ref 3.8–10.5)
WBC # FLD AUTO: 8.89 K/UL — SIGNIFICANT CHANGE UP (ref 3.8–10.5)

## 2020-06-16 PROCEDURE — 99233 SBSQ HOSP IP/OBS HIGH 50: CPT

## 2020-06-16 PROCEDURE — 99232 SBSQ HOSP IP/OBS MODERATE 35: CPT

## 2020-06-16 RX ORDER — ESCITALOPRAM OXALATE 10 MG/1
10 TABLET, FILM COATED ORAL AT BEDTIME
Refills: 0 | Status: DISCONTINUED | OUTPATIENT
Start: 2020-06-16 | End: 2020-06-18

## 2020-06-16 RX ADMIN — ZINC SULFATE TAB 220 MG (50 MG ZINC EQUIVALENT) 220 MILLIGRAM(S): 220 (50 ZN) TAB at 11:54

## 2020-06-16 RX ADMIN — Medication 1 APPLICATION(S): at 06:38

## 2020-06-16 RX ADMIN — Medication 1 TABLET(S): at 11:54

## 2020-06-16 RX ADMIN — Medication 325 MILLIGRAM(S): at 11:54

## 2020-06-16 RX ADMIN — GABAPENTIN 100 MILLIGRAM(S): 400 CAPSULE ORAL at 21:33

## 2020-06-16 RX ADMIN — PANTOPRAZOLE SODIUM 40 MILLIGRAM(S): 20 TABLET, DELAYED RELEASE ORAL at 06:38

## 2020-06-16 RX ADMIN — PANTOPRAZOLE SODIUM 40 MILLIGRAM(S): 20 TABLET, DELAYED RELEASE ORAL at 17:29

## 2020-06-16 RX ADMIN — Medication 500 MILLIGRAM(S): at 06:37

## 2020-06-16 RX ADMIN — Medication 500 MILLIGRAM(S): at 17:29

## 2020-06-16 RX ADMIN — ESCITALOPRAM OXALATE 10 MILLIGRAM(S): 10 TABLET, FILM COATED ORAL at 21:32

## 2020-06-16 RX ADMIN — HEPARIN SODIUM 5000 UNIT(S): 5000 INJECTION INTRAVENOUS; SUBCUTANEOUS at 17:29

## 2020-06-16 RX ADMIN — ATORVASTATIN CALCIUM 20 MILLIGRAM(S): 80 TABLET, FILM COATED ORAL at 21:32

## 2020-06-16 RX ADMIN — Medication 1 APPLICATION(S): at 17:37

## 2020-06-16 RX ADMIN — HEPARIN SODIUM 5000 UNIT(S): 5000 INJECTION INTRAVENOUS; SUBCUTANEOUS at 06:38

## 2020-06-16 RX ADMIN — Medication 1 TABLET(S): at 11:55

## 2020-06-16 RX ADMIN — POLYETHYLENE GLYCOL 3350 17 GRAM(S): 17 POWDER, FOR SOLUTION ORAL at 11:56

## 2020-06-16 NOTE — PROGRESS NOTE ADULT - SUBJECTIVE AND OBJECTIVE BOX
SUBJECTIVE & OVERNIGHT EVENTS:  Patient seen and examined.  No acute events overnight.  Observed participating well in therapy.  Continues to note anxiety, deconditioning.  requests lexapro changed to night as causes sleepiness during daytime.     [ ] Constitutional +fatigue        [] Cardio +palpitations              [] Resp +PETERSON  [X] GI WNL                            [X]  WNL                    [X] Heme WNL  [X] Endo WNL                       [X] Skin WNL                  [X] MSK WNL  [X] Neuro WNL                     [X] Cognitive WNL         [X] Psych WN       Vital Signs Last 24 Hrs  T(C): 36.6 (2020 07:52), Max: 36.8 (2020 00:47)  T(F): 97.9 (2020 07:52), Max: 98.2 (2020 00:47)  HR: 106 (2020 07:52) (106 - 110)  BP: 112/72 (2020 07:52) (112/72 - 112/74)  BP(mean): --  RR: 20 (2020 07:52) (20 - 20)  SpO2: 99% (2020 07:52) (98% - 99%)    PHYSICAL EXAM   Constitutional: NAD  HEENT: NCAT, EOMI, handling secretions well  Cardio: well-perfused  Resp: symmetric chest-rise, no increased WOB  GI: non-distended  : no downey  Extremities: well-perfused  Skin:   Wound on sacrum with minimal slough and no drainage.  Healthy granulation tissue visible.  No bone/muscle visible. Some adipose tissue visible.   1.6cm depth in center  Tunneling from 12-2 o'clock to depth of 2.5cm  Vertical length 5.8cm  Horizontal length 2.5 cm      LABS:                           8.5    8.89  )-----------( 444      ( 2020 05:30 )             25.6     06-16    141  |  106  |  29<H>  ----------------------------<  99  4.3   |  26  |  0.99    Ca    9.0      2020 05:30    TPro  7.2  /  Alb  3.2<L>  /  TBili  0.4  /  DBili  x   /  AST  43<H>  /  ALT  64<H>  /  AlkPhos  82  06-15      Urinalysis Basic - ( 15 Alex 2020 10:40 )    Color: Yellow / Appearance: Clear / S.015 / pH: x  Gluc: x / Ketone: Negative  / Bili: Negative / Urobili: Negative   Blood: x / Protein: Negative / Nitrite: Negative   Leuk Esterase: Negative / RBC: x / WBC x   Sq Epi: x / Non Sq Epi: x / Bacteria: x         MEDICATIONS  (STANDING):  ammonium lactate 12% Lotion 1 Application(s) Topical two times a day  ascorbic acid 500 milliGRAM(s) Oral two times a day  atorvastatin 20 milliGRAM(s) Oral at bedtime  calcium carbonate   1250 mG (OsCal) 1 Tablet(s) Oral daily  escitalopram 10 milliGRAM(s) Oral at bedtime  ferrous    sulfate 325 milliGRAM(s) Oral daily  gabapentin 100 milliGRAM(s) Oral at bedtime  heparin   Injectable 5000 Unit(s) SubCutaneous every 12 hours  multivitamin 1 Tablet(s) Oral daily  pantoprazole    Tablet 40 milliGRAM(s) Oral two times a day  polyethylene glycol 3350 17 Gram(s) Oral daily  sodium chloride 0.9%. 500 milliLiter(s) (500 mL/Hr) IV Continuous <Continuous>    MEDICATIONS  (PRN):  acetaminophen   Tablet .. 650 milliGRAM(s) Oral every 6 hours PRN Temp greater or equal to 38C (100.4F), Mild Pain (1 - 3)  ALPRAZolam 0.25 milliGRAM(s) Oral every 6 hours PRN Panic attack  aluminum hydroxide/magnesium hydroxide/simethicone Suspension 30 milliLiter(s) Oral every 6 hours PRN Dyspepsia  artificial  tears Solution 1 Drop(s) Both EYES every 2 hours PRN Dry Eyes  senna 2 Tablet(s) Oral at bedtime PRN Constipation  simethicone 80 milliGRAM(s) Chew three times a day PRN Gas

## 2020-06-16 NOTE — PROGRESS NOTE ADULT - ASSESSMENT
ASSESSMENT/PLAN:     50/F with no significant PMH hospitalized with COVID s/p intubation, trach and decannulation with Gait Instability, ADL impairments and Functional impairments.    COMORBIDITES/ACTIVE MEDICAL ISSUES     #Post-COVID debility  -start Comprehensive Rehab Program of PT/OT/SLP  -Eliquis 5mg bid for post COVID ppx- changed to heparin due to positive FOBT  -consider restarting eliquis, per hospitalist   -stopped albuterol for tachycardia/hyopkalemia   -start pulm rehab     #Leukocytosis  -slight on 6/15  -UA negative  -CXR 6/13 with "slight scarring in right upper lung field"  -trend 6/16    #Nocturnal Dyspnea  -checked overnight pulse ox  -awaiting official read, but preliminarily it does not appear that patient desaturated significantly overnight     #Anxiety  -continue xanax, lexapro    #Orthostasis/Hypotension/Tachycardia  -may be due to symptomatic anemia  -1U PRBCs on 6/9  -H/H stable since 6/9  -FOBT positive: GI recommend monitor for now. increase PPI to BID for GERD.   -EKG and echocardiogram reviewed. EF 50-60%.     #Scalp pain  -likely allodynic   -low-dose gabapentin at night     #Hypokalemia  -replete PRN, routine monitoring    #HLD  -c/w statin    #Healing ulcer to sacral region   -Per chart, wound was staged as 4 initially. As of 6/9, stage 3.   -Continue wet to dry dressing, cavilon to periwound, loosely packed with wet to dry dressing. Monitor signs of healing, may benefit from WVAC.   -Add MVI, vit c BID, and zinc x 9 days (limit use of zinc to avoid copper deficiency).  -Roho cushion while in wc.   -Turn and reposition at minimum of q2hrs to offload pressure.   -Continue sol and ensure.        Pain Mgmt - Tylenol PRN  GI/Bowel Mgmt -  Continent c/w Senna prn; dulcolax supp prn; c/w pantoprazole  /Bladder Mgmt - Continent, PVR    FEN   - Diet - Puree/Soft   - Dysphagia  SLP - evaluation and treatment      Precautions / PROPHYLAXIS:   - Falls, Cardiac, Sternal, Spinal, Seizure   - ortho: Weight bearing status: WBAT   - Lungs: Aspiration, Incentive Spirometer   - Pressure injury/Skin: Turn Q2hrs while in bed, OOB to Chair, PT/OT    - DVT: Eliquis for COVID prophylaxis changed to heparin due to positive FOBT.         ANTICIPATED DISCHARGE: 6/18 home

## 2020-06-16 NOTE — PROGRESS NOTE ADULT - ASSESSMENT
51 y/o woman with no significant PMH hospitalized with COVID s/p intubation, trach and decannulation, readmitted for chest tightness, and possibly pleuritis now admiited to [facility] for gait Instability, ADL impairments and Functional impairments.    Normocytic anemia  -Patient reports dark stool, could me melena vs effect of oral iron  -FOBT positive, reticulocyte % elevated, BUN elevated  -S/p 1 unit PRBC 6/9  -H/H mildly decreased at 8.5 today  -C/w PPI for prophylaxis for GI bleed  -C/w ferrous sulfate  -Monitor CBC, transfuse as needed for symptomatic anemia or Hbg<7    Leukocytosis  -Resolved  -Afebrile  -UA negative  -CXR negative for new infiltrate  -Monitor off antibiotics    Hypokalemia  -Resolved  -Monitor electrolyes, replete as needed    Scalp pain  -Likely allodynia, per rehab team  -C/w gabapentin  -Improving    Subclinical hypothyroidism  -TSH is mildly elevated   -FT4 is normal  -Therapy is not indicated at this time, could worsen tachycardia    Sinus tachycardia, orthostasis  -TTE is largely unremarkable  -Continue to hold beta agonists  -Will continue to monitor    Possible anxiety or depression disorder  -Patient appears anxious   -Evaluated by psychology, recommended to start antidepressant and anxiolytic medication  -Started on escitalopram with alprazolam as needed     Functional and gait instability:  - C/w PT/OT per primary team .    Pressure Ulcers- Buttocks  -Left buttock stage 2- barrier cream and allevyn  -Right buttock stage 4- aquacell packing and allevyn dressing    GERD  - C/w Protonix 40 mg PO QD     DVT PPX:   - SCDs

## 2020-06-16 NOTE — PROGRESS NOTE ADULT - SUBJECTIVE AND OBJECTIVE BOX
Patient is a 50y old  Female who presents with a chief complaint of COVID Debility (15 Alex 2020 21:20)      Patient seen and examined at bedside. Reports that scalp pain is improved today. Denies any chest pain or shortness of breath.    ALLERGIES:  No Known Allergies    MEDICATIONS  (STANDING):  ammonium lactate 12% Lotion 1 Application(s) Topical two times a day  ascorbic acid 500 milliGRAM(s) Oral two times a day  atorvastatin 20 milliGRAM(s) Oral at bedtime  calcium carbonate   1250 mG (OsCal) 1 Tablet(s) Oral daily  escitalopram 10 milliGRAM(s) Oral daily  ferrous    sulfate 325 milliGRAM(s) Oral daily  gabapentin 100 milliGRAM(s) Oral at bedtime  heparin   Injectable 5000 Unit(s) SubCutaneous every 12 hours  multivitamin 1 Tablet(s) Oral daily  pantoprazole    Tablet 40 milliGRAM(s) Oral two times a day  polyethylene glycol 3350 17 Gram(s) Oral daily  sodium chloride 0.9%. 500 milliLiter(s) (500 mL/Hr) IV Continuous <Continuous>    MEDICATIONS  (PRN):  acetaminophen   Tablet .. 650 milliGRAM(s) Oral every 6 hours PRN Temp greater or equal to 38C (100.4F), Mild Pain (1 - 3)  ALPRAZolam 0.25 milliGRAM(s) Oral every 6 hours PRN Panic attack  aluminum hydroxide/magnesium hydroxide/simethicone Suspension 30 milliLiter(s) Oral every 6 hours PRN Dyspepsia  artificial  tears Solution 1 Drop(s) Both EYES every 2 hours PRN Dry Eyes  senna 2 Tablet(s) Oral at bedtime PRN Constipation  simethicone 80 milliGRAM(s) Chew three times a day PRN Gas    Vital Signs Last 24 Hrs  T(F): 97.9 (2020 07:52), Max: 98.2 (2020 00:47)  HR: 106 (2020 07:52) (106 - 110)  BP: 112/72 (2020 07:52) (112/72 - 112/74)  RR: 20 (2020 07:52) (20 - 20)  SpO2: 99% (2020 07:52) (98% - 99%)  I&O's Summary    15 Alex 2020 07:01  -  2020 07:00  --------------------------------------------------------  IN: 0 mL / OUT: 5 mL / NET: -5 mL        PHYSICAL EXAM:  General: NAD, A/O x 3, Appears anxious  ENT: MMM  Neck: Supple, No JVD  Lungs: Clear to auscultation bilaterally  Cardio: RRR, S1/S2, No murmurs  Abdomen: Soft, Nontender, Nondistended; Bowel sounds present  Extremities: No calf tenderness, No pitting edema  Skin: Wound on sacrum without drainage     LABS:                        8.5    8.89  )-----------( 444      ( 2020 05:30 )             25.6       -    141  |  106  |  29  ----------------------------<  99  4.3   |  26  |  0.99    Ca    9.0      2020 05:30    TPro  7.2  /  Alb  3.2  /  TBili  0.4  /  DBili  x   /  AST  43  /  ALT  64  /  AlkPhos  82  06-15     eGFR if Non African American: 66 mL/min/1.73M2 (20 @ 05:30)  eGFR if African American: 77 mL/min/1.73M2 (20 @ 05:30)                               Urinalysis Basic - ( 15 Alex 2020 10:40 )    Color: Yellow / Appearance: Clear / S.015 / pH: x  Gluc: x / Ketone: Negative  / Bili: Negative / Urobili: Negative   Blood: x / Protein: Negative / Nitrite: Negative   Leuk Esterase: Negative / RBC: x / WBC x   Sq Epi: x / Non Sq Epi: x / Bacteria: x          RADIOLOGY & ADDITIONAL TESTS:    Care Discussed with Consultants/Other Providers:

## 2020-06-17 ENCOUNTER — TRANSCRIPTION ENCOUNTER (OUTPATIENT)
Age: 50
End: 2020-06-17

## 2020-06-17 PROBLEM — Z78.9 OTHER SPECIFIED HEALTH STATUS: Chronic | Status: ACTIVE | Noted: 2020-06-05

## 2020-06-17 LAB — SARS-COV-2 RNA SPEC QL NAA+PROBE: SIGNIFICANT CHANGE UP

## 2020-06-17 PROCEDURE — 99233 SBSQ HOSP IP/OBS HIGH 50: CPT

## 2020-06-17 PROCEDURE — 99232 SBSQ HOSP IP/OBS MODERATE 35: CPT

## 2020-06-17 RX ORDER — GUAIFENESIN/DEXTROMETHORPHAN 600MG-30MG
10 TABLET, EXTENDED RELEASE 12 HR ORAL EVERY 4 HOURS
Refills: 0 | Status: DISCONTINUED | OUTPATIENT
Start: 2020-06-17 | End: 2020-06-18

## 2020-06-17 RX ADMIN — Medication 1 TABLET(S): at 12:03

## 2020-06-17 RX ADMIN — Medication 325 MILLIGRAM(S): at 12:03

## 2020-06-17 RX ADMIN — Medication 1 TABLET(S): at 12:02

## 2020-06-17 RX ADMIN — HEPARIN SODIUM 5000 UNIT(S): 5000 INJECTION INTRAVENOUS; SUBCUTANEOUS at 17:33

## 2020-06-17 RX ADMIN — Medication 1 APPLICATION(S): at 06:32

## 2020-06-17 RX ADMIN — HEPARIN SODIUM 5000 UNIT(S): 5000 INJECTION INTRAVENOUS; SUBCUTANEOUS at 06:31

## 2020-06-17 RX ADMIN — Medication 500 MILLIGRAM(S): at 06:32

## 2020-06-17 RX ADMIN — POLYETHYLENE GLYCOL 3350 17 GRAM(S): 17 POWDER, FOR SOLUTION ORAL at 12:03

## 2020-06-17 RX ADMIN — PANTOPRAZOLE SODIUM 40 MILLIGRAM(S): 20 TABLET, DELAYED RELEASE ORAL at 17:33

## 2020-06-17 RX ADMIN — SIMETHICONE 80 MILLIGRAM(S): 80 TABLET, CHEWABLE ORAL at 17:14

## 2020-06-17 RX ADMIN — ESCITALOPRAM OXALATE 10 MILLIGRAM(S): 10 TABLET, FILM COATED ORAL at 21:26

## 2020-06-17 RX ADMIN — ATORVASTATIN CALCIUM 20 MILLIGRAM(S): 80 TABLET, FILM COATED ORAL at 21:26

## 2020-06-17 RX ADMIN — Medication 500 MILLIGRAM(S): at 17:33

## 2020-06-17 RX ADMIN — GABAPENTIN 100 MILLIGRAM(S): 400 CAPSULE ORAL at 21:26

## 2020-06-17 RX ADMIN — PANTOPRAZOLE SODIUM 40 MILLIGRAM(S): 20 TABLET, DELAYED RELEASE ORAL at 06:31

## 2020-06-17 NOTE — PROGRESS NOTE ADULT - ASSESSMENT
51 y/o woman with no significant PMH hospitalized with COVID s/p intubation, trach and decannulation, readmitted for chest tightness, and possibly pleuritis now admiited to [facility] for gait Instability, ADL impairments and Functional impairments.    Subacute cough  -Could be secondary to atelectasis vs GERD  -Start guaifenesin/dextromethorphan cough syrup q4h as needed  -C/w PPI BD  -Incentive spirometer while awake    Normocytic anemia  -Patient reports dark stool, could me melena vs effect of oral iron  -FOBT positive, reticulocyte % elevated, BUN elevated  -S/p 1 unit PRBC 6/9  -H/H stable at 8.5  -C/w PPI for prophylaxis for GI bleed  -C/w ferrous sulfate  -Monitor CBC, transfuse as needed for symptomatic anemia or Hbg<7    Leukocytosis  -Resolved  -Afebrile  -UA negative  -CXR negative for new infiltrate  -Monitor off antibiotics    Scalp pain  -Likely allodynia, per rehab team  -C/w gabapentin  -Improving    Subclinical hypothyroidism  -TSH is mildly elevated   -FT4 is normal  -Therapy is not indicated at this time, could worsen tachycardia    Sinus tachycardia, orthostasis  -TTE is largely unremarkable  -Continue to hold beta agonists  -Will continue to monitor    Possible anxiety or depression disorder  -Patient appears anxious   -Evaluated by psychology, recommended to start antidepressant and anxiolytic medication  -Started on escitalopram with alprazolam as needed     Functional and gait instability:  - C/w PT/OT per primary team .    Pressure Ulcers- Buttocks  -Left buttock stage 2- barrier cream and allevyn  -Right buttock stage 4- aquacell packing and allevyn dressing    GERD  - C/w Protonix 40 mg PO BID     DVT PPX:   - SCDs

## 2020-06-17 NOTE — DISCHARGE NOTE NURSING/CASE MANAGEMENT/SOCIAL WORK - NSDCFUADDAPPT_GEN_ALL_CORE_FT
Follow up with:  -your primary care doctor  -gastroenterologist  -psychologist or other therapist  -pulmonologist

## 2020-06-17 NOTE — CHART NOTE - NSCHARTNOTEFT_GEN_A_CORE
Nutrition Follow Up Note  Hospital Course   (Per Electronic Medical Record):     Source:  Patient [X]  Medical Record [X]      Diet:   Regular Diet w/ Thin Liquids  Tolerates Diet Well  No Chewing/Swallowing Difficulties  No Recent Nausea, Vomiting, Diarrhea or Constipation  Consumes % of Meals (as Per Documentation) - States Good PO Intake/Appetite   on Ensure Clear 8oz PO BID (Provides 480kcal & 16grams of Protein)  on Darrion 1 Packet BID (Provides 180kcal & 28grams of Protein)   Patient Takes Nutrition Supplement     Enteral/Parenteral Nutrition: Not Applicable    Current Weight: 115.7lb on 6/7  Obtain New Weight  Obtain Weights Weekly     Pertinent Medications: MEDICATIONS  (STANDING):  ammonium lactate 12% Lotion 1 Application(s) Topical two times a day  ascorbic acid 500 milliGRAM(s) Oral two times a day  atorvastatin 20 milliGRAM(s) Oral at bedtime  calcium carbonate   1250 mG (OsCal) 1 Tablet(s) Oral daily  escitalopram 10 milliGRAM(s) Oral at bedtime  ferrous    sulfate 325 milliGRAM(s) Oral daily  gabapentin 100 milliGRAM(s) Oral at bedtime  heparin   Injectable 5000 Unit(s) SubCutaneous every 12 hours  multivitamin 1 Tablet(s) Oral daily  pantoprazole    Tablet 40 milliGRAM(s) Oral two times a day  polyethylene glycol 3350 17 Gram(s) Oral daily  sodium chloride 0.9%. 500 milliLiter(s) (500 mL/Hr) IV Continuous <Continuous>    MEDICATIONS  (PRN):  acetaminophen   Tablet .. 650 milliGRAM(s) Oral every 6 hours PRN Temp greater or equal to 38C (100.4F), Mild Pain (1 - 3)  ALPRAZolam 0.25 milliGRAM(s) Oral every 6 hours PRN Panic attack  aluminum hydroxide/magnesium hydroxide/simethicone Suspension 30 milliLiter(s) Oral every 6 hours PRN Dyspepsia  artificial  tears Solution 1 Drop(s) Both EYES every 2 hours PRN Dry Eyes  guaifenesin/dextromethorphan  Syrup 10 milliLiter(s) Oral every 4 hours PRN Cough  senna 2 Tablet(s) Oral at bedtime PRN Constipation  simethicone 80 milliGRAM(s) Chew three times a day PRN Gas    Pertinent Labs:  06-16 Na141 mmol/L Glu 99 mg/dL K+ 4.3 mmol/L Cr  0.99 mg/dL BUN 29 mg/dL<H> 06-15 Alb 3.2 g/dL<L>    Skin: Stage 4 Pressure Ulcer on Right Buttock  Stage 2 Pressure Ulcer on Left Buttock     Edema: None Noted     Last Bowel Movement: on 6/15    Estimated Needs:   [X] No Change Since Previous Assessment    Previous Nutrition Diagnosis:   Severe Malnutrition  Increased Nutrient Needs - Calories & Protein     Nutrition Diagnosis is [X] Ongoing - Continues on Nutrition Supplement & Patient Takes Nutrition Supplement     New Nutrition Diagnosis: [X] Not Applicable    Interventions:   1. Recommend Continue Nutrition Plan of Care     Monitoring & Evaluation:   [X] Weights   [X] PO Intake   [X] Skin Integrity   [X] Follow Up (Per Protocol)  [X] Tolerance to Diet Prescription   [X] Other: Labs     Registered Dietitian/Nutritionist Remains Available.  Clarence Bañuelos RDN    Pager # [phone]  Phone# [phone]

## 2020-06-17 NOTE — PROGRESS NOTE ADULT - SUBJECTIVE AND OBJECTIVE BOX
SUBJECTIVE & OVERNIGHT EVENTS:  Patient seen and examined.  No acute events overnight.  Observed participating well in therapy.   progressing well in therapy, meeting discharge goals.     [ ] Constitutional +fatigue        [] Cardio +palpitations              [] Resp +PETERSON  [X] GI WNL                            [X]  WNL                    [X] Heme WNL  [X] Endo WNL                       [X] Skin WNL                  [X] MSK WNL  [X] Neuro WNL                     [X] Cognitive WNL         [X] Psych WN       Vital Signs Last 24 Hrs  T(C): 36.7 (2020 07:22), Max: 36.7 (2020 07:22)  T(F): 98 (2020 07:22), Max: 98 (2020 07:22)  HR: 105 (2020 07:22) (98 - 105)  BP: 95/65 (2020 07:22) (95/65 - 111/70)  BP(mean): --  RR: 20 (2020 07:22) (20 - 20)  SpO2: 99% (2020 07:22) (98% - 99%)  PHYSICAL EXAM   Constitutional: NAD  HEENT: NCAT, EOMI, handling secretions well  Cardio: well-perfused  Resp: symmetric chest-rise, no increased WOB  GI: non-distended  : no downey  Extremities: well-perfused  Skin:   Wound on sacrum with minimal slough and no drainage.  Healthy granulation tissue visible.  No bone/muscle visible. Some adipose tissue visible.   1.6cm depth in center  Tunneling from 12-2 o'clock to depth of 2.5cm  Vertical length 5.8cm  Horizontal length 2.5 cm      LABS:                           8.5    8.89  )-----------( 444      ( 2020 05:30 )             25.6     06-16    141  |  106  |  29<H>  ----------------------------<  99  4.3   |  26  |  0.99    Ca    9.0      2020 05:30    TPro  7.2  /  Alb  3.2<L>  /  TBili  0.4  /  DBili  x   /  AST  43<H>  /  ALT  64<H>  /  AlkPhos  82  06-15      Urinalysis Basic - ( 15 Alex 2020 10:40 )    Color: Yellow / Appearance: Clear / S.015 / pH: x  Gluc: x / Ketone: Negative  / Bili: Negative / Urobili: Negative   Blood: x / Protein: Negative / Nitrite: Negative   Leuk Esterase: Negative / RBC: x / WBC x   Sq Epi: x / Non Sq Epi: x / Bacteria: x         MEDICATIONS  (STANDING):  ammonium lactate 12% Lotion 1 Application(s) Topical two times a day  ascorbic acid 500 milliGRAM(s) Oral two times a day  atorvastatin 20 milliGRAM(s) Oral at bedtime  calcium carbonate   1250 mG (OsCal) 1 Tablet(s) Oral daily  escitalopram 10 milliGRAM(s) Oral at bedtime  ferrous    sulfate 325 milliGRAM(s) Oral daily  gabapentin 100 milliGRAM(s) Oral at bedtime  heparin   Injectable 5000 Unit(s) SubCutaneous every 12 hours  multivitamin 1 Tablet(s) Oral daily  pantoprazole    Tablet 40 milliGRAM(s) Oral two times a day  polyethylene glycol 3350 17 Gram(s) Oral daily  sodium chloride 0.9%. 500 milliLiter(s) (500 mL/Hr) IV Continuous <Continuous>    MEDICATIONS  (PRN):  acetaminophen   Tablet .. 650 milliGRAM(s) Oral every 6 hours PRN Temp greater or equal to 38C (100.4F), Mild Pain (1 - 3)  ALPRAZolam 0.25 milliGRAM(s) Oral every 6 hours PRN Panic attack  aluminum hydroxide/magnesium hydroxide/simethicone Suspension 30 milliLiter(s) Oral every 6 hours PRN Dyspepsia  artificial  tears Solution 1 Drop(s) Both EYES every 2 hours PRN Dry Eyes  senna 2 Tablet(s) Oral at bedtime PRN Constipation  simethicone 80 milliGRAM(s) Chew three times a day PRN Gas

## 2020-06-17 NOTE — PROGRESS NOTE ADULT - SUBJECTIVE AND OBJECTIVE BOX
Patient is a 50y old  Female who presents with a chief complaint of COVID Debility (2020 14:36)      Patient seen and examined at bedside. Complains of cough associated with burning chest pain. Denies fever or dyspnea. Reports persistent head pain/sensitivity when she is supine.     ALLERGIES:  No Known Allergies    MEDICATIONS  (STANDING):  ammonium lactate 12% Lotion 1 Application(s) Topical two times a day  ascorbic acid 500 milliGRAM(s) Oral two times a day  atorvastatin 20 milliGRAM(s) Oral at bedtime  calcium carbonate   1250 mG (OsCal) 1 Tablet(s) Oral daily  escitalopram 10 milliGRAM(s) Oral at bedtime  ferrous    sulfate 325 milliGRAM(s) Oral daily  gabapentin 100 milliGRAM(s) Oral at bedtime  heparin   Injectable 5000 Unit(s) SubCutaneous every 12 hours  multivitamin 1 Tablet(s) Oral daily  pantoprazole    Tablet 40 milliGRAM(s) Oral two times a day  polyethylene glycol 3350 17 Gram(s) Oral daily  sodium chloride 0.9%. 500 milliLiter(s) (500 mL/Hr) IV Continuous <Continuous>    MEDICATIONS  (PRN):  acetaminophen   Tablet .. 650 milliGRAM(s) Oral every 6 hours PRN Temp greater or equal to 38C (100.4F), Mild Pain (1 - 3)  ALPRAZolam 0.25 milliGRAM(s) Oral every 6 hours PRN Panic attack  aluminum hydroxide/magnesium hydroxide/simethicone Suspension 30 milliLiter(s) Oral every 6 hours PRN Dyspepsia  artificial  tears Solution 1 Drop(s) Both EYES every 2 hours PRN Dry Eyes  guaifenesin/dextromethorphan  Syrup 20 milliLiter(s) Oral every 4 hours PRN Cough  senna 2 Tablet(s) Oral at bedtime PRN Constipation  simethicone 80 milliGRAM(s) Chew three times a day PRN Gas    Vital Signs Last 24 Hrs  T(F): 98 (2020 07:22), Max: 98 (2020 07:22)  HR: 105 (2020 07:22) (98 - 105)  BP: 95/65 (2020 07:22) (95/65 - 111/70)  RR: 20 (2020 07:22) (20 - 20)  SpO2: 99% (2020 07:22) (98% - 99%)  I&O's Summary      PHYSICAL EXAM:  General: NAD, A/O x 3  ENT: MMM  Neck: Supple, No JVD  Lungs: Clear to auscultation bilaterally  Cardio: RRR, S1/S2, No murmurs  Abdomen: Soft, Nontender, Nondistended; Bowel sounds present  Extremities: No calf tenderness, No pitting edema  Skin: Wound on sacrum without drainage     LABS:                        8.5    8.89  )-----------( 444      ( 2020 05:30 )             25.6           141  |  106  |  29  ----------------------------<  99  4.3   |  26  |  0.99    Ca    9.0      2020 05:30    TPro  7.2  /  Alb  3.2  /  TBili  0.4  /  DBili  x   /  AST  43  /  ALT  64  /  AlkPhos  82  06-15     eGFR if Non African American: 66 mL/min/1.73M2 (20 @ 05:30)  eGFR if African American: 77 mL/min/1.73M2 (20 @ 05:30)                               Urinalysis Basic - ( 15 Alex 2020 10:40 )    Color: Yellow / Appearance: Clear / S.015 / pH: x  Gluc: x / Ketone: Negative  / Bili: Negative / Urobili: Negative   Blood: x / Protein: Negative / Nitrite: Negative   Leuk Esterase: Negative / RBC: x / WBC x   Sq Epi: x / Non Sq Epi: x / Bacteria: x          RADIOLOGY & ADDITIONAL TESTS:    Care Discussed with Consultants/Other Providers:

## 2020-06-18 VITALS
DIASTOLIC BLOOD PRESSURE: 72 MMHG | HEART RATE: 110 BPM | RESPIRATION RATE: 17 BRPM | TEMPERATURE: 98 F | SYSTOLIC BLOOD PRESSURE: 106 MMHG | OXYGEN SATURATION: 98 %

## 2020-06-18 PROCEDURE — 92610 EVALUATE SWALLOWING FUNCTION: CPT

## 2020-06-18 PROCEDURE — 97167 OT EVAL HIGH COMPLEX 60 MIN: CPT

## 2020-06-18 PROCEDURE — 97530 THERAPEUTIC ACTIVITIES: CPT

## 2020-06-18 PROCEDURE — 94762 N-INVAS EAR/PLS OXIMTRY CONT: CPT

## 2020-06-18 PROCEDURE — 97112 NEUROMUSCULAR REEDUCATION: CPT

## 2020-06-18 PROCEDURE — 97163 PT EVAL HIGH COMPLEX 45 MIN: CPT

## 2020-06-18 PROCEDURE — 83540 ASSAY OF IRON: CPT

## 2020-06-18 PROCEDURE — 85045 AUTOMATED RETICULOCYTE COUNT: CPT

## 2020-06-18 PROCEDURE — 86923 COMPATIBILITY TEST ELECTRIC: CPT

## 2020-06-18 PROCEDURE — 97535 SELF CARE MNGMENT TRAINING: CPT

## 2020-06-18 PROCEDURE — 86769 SARS-COV-2 COVID-19 ANTIBODY: CPT

## 2020-06-18 PROCEDURE — 82728 ASSAY OF FERRITIN: CPT

## 2020-06-18 PROCEDURE — 80048 BASIC METABOLIC PNL TOTAL CA: CPT

## 2020-06-18 PROCEDURE — 85379 FIBRIN DEGRADATION QUANT: CPT

## 2020-06-18 PROCEDURE — 84466 ASSAY OF TRANSFERRIN: CPT

## 2020-06-18 PROCEDURE — 99238 HOSP IP/OBS DSCHRG MGMT 30/<: CPT

## 2020-06-18 PROCEDURE — 82272 OCCULT BLD FECES 1-3 TESTS: CPT

## 2020-06-18 PROCEDURE — 86850 RBC ANTIBODY SCREEN: CPT

## 2020-06-18 PROCEDURE — 86140 C-REACTIVE PROTEIN: CPT

## 2020-06-18 PROCEDURE — 71045 X-RAY EXAM CHEST 1 VIEW: CPT

## 2020-06-18 PROCEDURE — 93005 ELECTROCARDIOGRAM TRACING: CPT

## 2020-06-18 PROCEDURE — 36415 COLL VENOUS BLD VENIPUNCTURE: CPT

## 2020-06-18 PROCEDURE — 86901 BLOOD TYPING SEROLOGIC RH(D): CPT

## 2020-06-18 PROCEDURE — 81001 URINALYSIS AUTO W/SCOPE: CPT

## 2020-06-18 PROCEDURE — 92523 SPEECH SOUND LANG COMPREHEN: CPT

## 2020-06-18 PROCEDURE — 94640 AIRWAY INHALATION TREATMENT: CPT

## 2020-06-18 PROCEDURE — 80053 COMPREHEN METABOLIC PANEL: CPT

## 2020-06-18 PROCEDURE — 85027 COMPLETE CBC AUTOMATED: CPT

## 2020-06-18 PROCEDURE — 93307 TTE W/O DOPPLER COMPLETE: CPT

## 2020-06-18 PROCEDURE — 84443 ASSAY THYROID STIM HORMONE: CPT

## 2020-06-18 PROCEDURE — 99232 SBSQ HOSP IP/OBS MODERATE 35: CPT

## 2020-06-18 PROCEDURE — 36430 TRANSFUSION BLD/BLD COMPNT: CPT

## 2020-06-18 PROCEDURE — P9016: CPT

## 2020-06-18 PROCEDURE — 99233 SBSQ HOSP IP/OBS HIGH 50: CPT | Mod: 25

## 2020-06-18 PROCEDURE — 83550 IRON BINDING TEST: CPT

## 2020-06-18 PROCEDURE — 86900 BLOOD TYPING SEROLOGIC ABO: CPT

## 2020-06-18 PROCEDURE — 84439 ASSAY OF FREE THYROXINE: CPT

## 2020-06-18 PROCEDURE — U0003: CPT

## 2020-06-18 PROCEDURE — 97110 THERAPEUTIC EXERCISES: CPT

## 2020-06-18 PROCEDURE — 97116 GAIT TRAINING THERAPY: CPT

## 2020-06-18 RX ORDER — SOD,AMMONIUM,POTASSIUM LACTATE
1 CREAM (GRAM) TOPICAL
Qty: 0 | Refills: 0 | DISCHARGE
Start: 2020-06-18

## 2020-06-18 RX ORDER — POLYETHYLENE GLYCOL 3350 17 G/17G
17 POWDER, FOR SOLUTION ORAL
Qty: 0 | Refills: 0 | DISCHARGE
Start: 2020-06-18

## 2020-06-18 RX ORDER — ACETAMINOPHEN 500 MG
2 TABLET ORAL
Qty: 0 | Refills: 0 | DISCHARGE
Start: 2020-06-18

## 2020-06-18 RX ORDER — SENNA PLUS 8.6 MG/1
2 TABLET ORAL
Qty: 0 | Refills: 0 | DISCHARGE
Start: 2020-06-18

## 2020-06-18 RX ORDER — PANTOPRAZOLE SODIUM 20 MG/1
1 TABLET, DELAYED RELEASE ORAL
Qty: 60 | Refills: 0
Start: 2020-06-18 | End: 2020-07-17

## 2020-06-18 RX ORDER — ATORVASTATIN CALCIUM 80 MG/1
1 TABLET, FILM COATED ORAL
Qty: 30 | Refills: 0
Start: 2020-06-18 | End: 2020-07-17

## 2020-06-18 RX ORDER — ALPRAZOLAM 0.25 MG
1 TABLET ORAL
Qty: 7 | Refills: 0
Start: 2020-06-18 | End: 2020-06-24

## 2020-06-18 RX ORDER — ESCITALOPRAM OXALATE 10 MG/1
1 TABLET, FILM COATED ORAL
Qty: 30 | Refills: 0
Start: 2020-06-18 | End: 2020-07-17

## 2020-06-18 RX ORDER — ASCORBIC ACID 60 MG
1 TABLET,CHEWABLE ORAL
Qty: 0 | Refills: 0 | DISCHARGE
Start: 2020-06-18

## 2020-06-18 RX ORDER — CALCIUM CARBONATE 500(1250)
1 TABLET ORAL
Qty: 0 | Refills: 0 | DISCHARGE
Start: 2020-06-18

## 2020-06-18 RX ORDER — GUAIFENESIN/DEXTROMETHORPHAN 600MG-30MG
10 TABLET, EXTENDED RELEASE 12 HR ORAL
Qty: 0 | Refills: 0 | DISCHARGE
Start: 2020-06-18

## 2020-06-18 RX ORDER — GABAPENTIN 400 MG/1
1 CAPSULE ORAL
Qty: 30 | Refills: 0
Start: 2020-06-18 | End: 2020-07-17

## 2020-06-18 RX ADMIN — Medication 500 MILLIGRAM(S): at 05:25

## 2020-06-18 RX ADMIN — SIMETHICONE 80 MILLIGRAM(S): 80 TABLET, CHEWABLE ORAL at 11:34

## 2020-06-18 RX ADMIN — POLYETHYLENE GLYCOL 3350 17 GRAM(S): 17 POWDER, FOR SOLUTION ORAL at 11:31

## 2020-06-18 RX ADMIN — PANTOPRAZOLE SODIUM 40 MILLIGRAM(S): 20 TABLET, DELAYED RELEASE ORAL at 05:25

## 2020-06-18 RX ADMIN — Medication 1 TABLET(S): at 11:31

## 2020-06-18 RX ADMIN — Medication 1 TABLET(S): at 11:30

## 2020-06-18 RX ADMIN — HEPARIN SODIUM 5000 UNIT(S): 5000 INJECTION INTRAVENOUS; SUBCUTANEOUS at 05:25

## 2020-06-18 RX ADMIN — Medication 325 MILLIGRAM(S): at 11:30

## 2020-06-18 RX ADMIN — Medication 1 APPLICATION(S): at 05:25

## 2020-06-18 NOTE — PROGRESS NOTE ADULT - ASSESSMENT
This is a 51 y/o female with no significant PMH hospitalized with COVID s/p intubation, trach and decannulation with Gait Instability, ADL impairments and Functional impairments and pressure wounds.       A/P  Healing stage 4 pressure ulcer to sacral region   Continue BID dressing changes, wound gel to base and wet to dry dressing, loosely packed.   Wound is on a good trajectory to heal, does not need a wvac at this time.     Continue MVI, vit c BID. Completed zinc.  Would recommend continuing Ensure and Darrion at home.   Patient educated to continue to offload pressure.   Daughter to come in this afternoon, will show daughter how to do wound care.

## 2020-06-18 NOTE — PROGRESS NOTE ADULT - ASSESSMENT
51 y/o woman with no significant PMH hospitalized with COVID s/p intubation, trach and decannulation, readmitted for chest tightness, and possibly pleuritis now admiited to [facility] for gait Instability, ADL impairments and Functional impairments.    Subacute cough  -Could be secondary to atelectasis vs GERD  -Start guaifenesin/dextromethorphan cough syrup q4h as needed  -C/w PPI BD  -Incentive spirometer while awake    Normocytic anemia  -Patient reports dark stool, could me melena vs effect of oral iron  -FOBT positive, reticulocyte % elevated, BUN elevated  -S/p 1 unit PRBC 6/9  -H/H stable  -C/w PPI for prophylaxis for GI bleed  -C/w ferrous sulfate  -Monitor CBC, transfuse as needed for symptomatic anemia or Hbg<7    Leukocytosis  -Resolved  -Afebrile  -UA negative  -CXR negative for new infiltrate  -Monitor off antibiotics    Scalp pain  -Likely allodynia, per rehab team  -C/w gabapentin  -Improving    Subclinical hypothyroidism  -TSH is mildly elevated   -FT4 is normal  -Therapy is not indicated at this time, could worsen tachycardia    Sinus tachycardia, orthostasis  -TTE is largely unremarkable  -Continue to hold beta agonists  -Will continue to monitor    Possible anxiety or depression disorder  -Patient appears anxious   -Evaluated by psychology, recommended to start antidepressant and anxiolytic medication  -Started on escitalopram with alprazolam as needed     Functional and gait instability:  - C/w PT/OT per primary team .    Pressure Ulcers- Buttocks  -Left buttock stage 2- barrier cream and allevyn  -Right buttock stage 4- aquacell packing and allevyn dressing    GERD  - C/w Protonix 40 mg PO BID     DVT PPX:   - SCDs

## 2020-06-18 NOTE — PROGRESS NOTE ADULT - REASON FOR ADMISSION
COVID Debility

## 2020-06-18 NOTE — PROGRESS NOTE ADULT - SUBJECTIVE AND OBJECTIVE BOX
Patient is a 50y old  Female who presents with a chief complaint of COVID Debility (17 Jun 2020 13:37)      Patient seen and examined at bedside. Complains of dyspepsia after eating. Also complains of chronic cough. Denies shortness of breath. Denies nausea or vomiting.     ALLERGIES:  No Known Allergies    MEDICATIONS  (STANDING):  ammonium lactate 12% Lotion 1 Application(s) Topical two times a day  ascorbic acid 500 milliGRAM(s) Oral two times a day  atorvastatin 20 milliGRAM(s) Oral at bedtime  calcium carbonate   1250 mG (OsCal) 1 Tablet(s) Oral daily  escitalopram 10 milliGRAM(s) Oral at bedtime  ferrous    sulfate 325 milliGRAM(s) Oral daily  gabapentin 100 milliGRAM(s) Oral at bedtime  heparin   Injectable 5000 Unit(s) SubCutaneous every 12 hours  multivitamin 1 Tablet(s) Oral daily  pantoprazole    Tablet 40 milliGRAM(s) Oral two times a day  polyethylene glycol 3350 17 Gram(s) Oral daily  sodium chloride 0.9%. 500 milliLiter(s) (500 mL/Hr) IV Continuous <Continuous>    MEDICATIONS  (PRN):  acetaminophen   Tablet .. 650 milliGRAM(s) Oral every 6 hours PRN Temp greater or equal to 38C (100.4F), Mild Pain (1 - 3)  ALPRAZolam 0.25 milliGRAM(s) Oral every 6 hours PRN Panic attack  aluminum hydroxide/magnesium hydroxide/simethicone Suspension 30 milliLiter(s) Oral every 6 hours PRN Dyspepsia  artificial  tears Solution 1 Drop(s) Both EYES every 2 hours PRN Dry Eyes  guaifenesin/dextromethorphan  Syrup 10 milliLiter(s) Oral every 4 hours PRN Cough  senna 2 Tablet(s) Oral at bedtime PRN Constipation  simethicone 80 milliGRAM(s) Chew three times a day PRN Gas    Vital Signs Last 24 Hrs  T(F): 98.4 (18 Jun 2020 09:10), Max: 98.7 (17 Jun 2020 20:15)  HR: 110 (18 Jun 2020 09:10) (97 - 110)  BP: 106/72 (18 Jun 2020 09:10) (94/59 - 106/72)  RR: 17 (18 Jun 2020 09:10) (16 - 17)  SpO2: 98% (18 Jun 2020 09:10) (98% - 99%)  I&O's Summary      PHYSICAL EXAM:  General: NAD, A/O x 3  ENT: MMM  Neck: Supple, No JVD  Lungs: Clear to auscultation bilaterally  Cardio: RRR, S1/S2, No murmurs  Abdomen: Soft, Nontender, Nondistended; Bowel sounds present  Extremities: No calf tenderness, No pitting edema      LABS:                        8.5    8.89  )-----------( 444      ( 16 Jun 2020 05:30 )             25.6       06-16    141  |  106  |  29  ----------------------------<  99  4.3   |  26  |  0.99    Ca    9.0      16 Jun 2020 05:30       eGFR if Non African American: 66 mL/min/1.73M2 (06-16-20 @ 05:30)  eGFR if African American: 77 mL/min/1.73M2 (06-16-20 @ 05:30)                                     RADIOLOGY & ADDITIONAL TESTS:    Care Discussed with Consultants/Other Providers:

## 2020-06-25 DIAGNOSIS — Z51.89 ENCOUNTER FOR OTHER SPECIFIED AFTERCARE: ICD-10-CM

## 2020-06-25 DIAGNOSIS — R05 COUGH: ICD-10-CM

## 2020-06-25 DIAGNOSIS — U07.1 COVID-19: ICD-10-CM

## 2020-06-25 DIAGNOSIS — T48.6X5A ADVERSE EFFECT OF ANTIASTHMATICS, INITIAL ENCOUNTER: ICD-10-CM

## 2020-06-25 DIAGNOSIS — F41.0 PANIC DISORDER [EPISODIC PAROXYSMAL ANXIETY]: ICD-10-CM

## 2020-06-25 DIAGNOSIS — R26.9 UNSPECIFIED ABNORMALITIES OF GAIT AND MOBILITY: ICD-10-CM

## 2020-06-25 DIAGNOSIS — R13.10 DYSPHAGIA, UNSPECIFIED: ICD-10-CM

## 2020-06-25 DIAGNOSIS — R09.1 PLEURISY: ICD-10-CM

## 2020-06-25 DIAGNOSIS — R19.5 OTHER FECAL ABNORMALITIES: ICD-10-CM

## 2020-06-25 DIAGNOSIS — D72.829 ELEVATED WHITE BLOOD CELL COUNT, UNSPECIFIED: ICD-10-CM

## 2020-06-25 DIAGNOSIS — K59.00 CONSTIPATION, UNSPECIFIED: ICD-10-CM

## 2020-06-25 DIAGNOSIS — E43 UNSPECIFIED SEVERE PROTEIN-CALORIE MALNUTRITION: ICD-10-CM

## 2020-06-25 DIAGNOSIS — G62.9 POLYNEUROPATHY, UNSPECIFIED: ICD-10-CM
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
## 2020-06-25 DIAGNOSIS — F41.9 ANXIETY DISORDER, UNSPECIFIED: ICD-10-CM

## 2020-06-25 DIAGNOSIS — E78.5 HYPERLIPIDEMIA, UNSPECIFIED: ICD-10-CM

## 2020-06-25 DIAGNOSIS — R06.09 OTHER FORMS OF DYSPNEA: ICD-10-CM

## 2020-06-25 DIAGNOSIS — R00.0 TACHYCARDIA, UNSPECIFIED: ICD-10-CM

## 2020-06-25 DIAGNOSIS — K21.9 GASTRO-ESOPHAGEAL REFLUX DISEASE WITHOUT ESOPHAGITIS: ICD-10-CM

## 2020-06-25 DIAGNOSIS — R51 HEADACHE: ICD-10-CM

## 2020-06-25 DIAGNOSIS — I95.1 ORTHOSTATIC HYPOTENSION: ICD-10-CM

## 2020-06-25 DIAGNOSIS — G31.84 MILD COGNITIVE IMPAIRMENT OF UNCERTAIN OR UNKNOWN ETIOLOGY: ICD-10-CM

## 2020-06-25 DIAGNOSIS — D64.9 ANEMIA, UNSPECIFIED: ICD-10-CM

## 2020-06-25 DIAGNOSIS — E87.6 HYPOKALEMIA: ICD-10-CM

## 2020-06-25 DIAGNOSIS — R53.81 OTHER MALAISE: ICD-10-CM

## 2020-06-25 DIAGNOSIS — E03.8 OTHER SPECIFIED HYPOTHYROIDISM: ICD-10-CM

## 2020-07-15 DIAGNOSIS — Z87.09 PERSONAL HISTORY OF OTHER DISEASES OF THE RESPIRATORY SYSTEM: ICD-10-CM

## 2020-07-15 RX ORDER — ALUMINUM HYDROXIDE 320 MG/5ML
320 LIQUID ORAL
Refills: 0 | Status: ACTIVE | COMMUNITY

## 2020-07-15 RX ORDER — ALPRAZOLAM 0.25 MG/1
0.25 TABLET ORAL
Refills: 0 | Status: ACTIVE | COMMUNITY

## 2020-07-15 RX ORDER — POLYETHYLENE GLYCOL 3350 17 G/17G
POWDER, FOR SOLUTION ORAL
Refills: 0 | Status: ACTIVE | COMMUNITY

## 2020-07-15 RX ORDER — ATORVASTATIN CALCIUM 20 MG/1
20 TABLET, FILM COATED ORAL
Refills: 1 | Status: ACTIVE | COMMUNITY

## 2020-07-15 RX ORDER — ESCITALOPRAM OXALATE 10 MG/1
10 TABLET ORAL
Refills: 0 | Status: ACTIVE | COMMUNITY

## 2020-07-15 RX ORDER — B-COMPLEX WITH VITAMIN C
1250 (500 CA) TABLET ORAL DAILY
Refills: 0 | Status: ACTIVE | COMMUNITY

## 2020-07-15 RX ORDER — SENNOSIDES 8.6 MG TABLETS 8.6 MG/1
TABLET ORAL
Refills: 0 | Status: ACTIVE | COMMUNITY

## 2020-07-15 RX ORDER — PANTOPRAZOLE 40 MG/1
40 TABLET, DELAYED RELEASE ORAL DAILY
Qty: 30 | Refills: 0 | Status: ACTIVE | COMMUNITY

## 2020-07-15 RX ORDER — MULTIVITAMIN
TABLET ORAL
Refills: 0 | Status: ACTIVE | COMMUNITY

## 2020-07-15 RX ORDER — MULTIVIT-MIN/FOLIC/VIT K/LYCOP 400-300MCG
500 TABLET ORAL
Qty: 180 | Refills: 1 | Status: ACTIVE | COMMUNITY

## 2020-07-17 ENCOUNTER — OUTPATIENT (OUTPATIENT)
Dept: OUTPATIENT SERVICES | Facility: HOSPITAL | Age: 50
LOS: 1 days | Discharge: HOME | End: 2020-07-17

## 2020-07-17 DIAGNOSIS — D64.9 ANEMIA, UNSPECIFIED: ICD-10-CM

## 2020-07-21 ENCOUNTER — APPOINTMENT (OUTPATIENT)
Dept: HEMATOLOGY ONCOLOGY | Facility: CLINIC | Age: 50
End: 2020-07-21

## 2020-08-03 ENCOUNTER — APPOINTMENT (OUTPATIENT)
Dept: PHYSICAL MEDICINE AND REHAB | Facility: CLINIC | Age: 50
End: 2020-08-03
Payer: MEDICAID

## 2020-08-03 VITALS
WEIGHT: 130 LBS | HEIGHT: 64 IN | SYSTOLIC BLOOD PRESSURE: 89 MMHG | TEMPERATURE: 98.2 F | BODY MASS INDEX: 22.2 KG/M2 | DIASTOLIC BLOOD PRESSURE: 59 MMHG | HEART RATE: 94 BPM | OXYGEN SATURATION: 100 %

## 2020-08-03 DIAGNOSIS — Z80.3 FAMILY HISTORY OF MALIGNANT NEOPLASM OF BREAST: ICD-10-CM

## 2020-08-03 DIAGNOSIS — Z80.8 FAMILY HISTORY OF MALIGNANT NEOPLASM OF OTHER ORGANS OR SYSTEMS: ICD-10-CM

## 2020-08-03 PROCEDURE — 99215 OFFICE O/P EST HI 40 MIN: CPT

## 2020-08-05 ENCOUNTER — APPOINTMENT (OUTPATIENT)
Dept: PULMONOLOGY | Facility: CLINIC | Age: 50
End: 2020-08-05
Payer: MEDICAID

## 2020-08-05 VITALS
OXYGEN SATURATION: 98 % | WEIGHT: 132 LBS | TEMPERATURE: 97.7 F | HEART RATE: 100 BPM | HEIGHT: 64 IN | DIASTOLIC BLOOD PRESSURE: 62 MMHG | SYSTOLIC BLOOD PRESSURE: 98 MMHG | BODY MASS INDEX: 22.53 KG/M2 | RESPIRATION RATE: 18 BRPM

## 2020-08-05 DIAGNOSIS — Z78.9 OTHER SPECIFIED HEALTH STATUS: ICD-10-CM

## 2020-08-05 PROCEDURE — 99205 OFFICE O/P NEW HI 60 MIN: CPT

## 2020-08-05 RX ORDER — GABAPENTIN 100 MG/1
100 CAPSULE ORAL TWICE DAILY
Qty: 60 | Refills: 3 | Status: ACTIVE | COMMUNITY

## 2020-08-05 NOTE — REVIEW OF SYSTEMS
[Negative] : Heme/Lymph [FreeTextEntry2] : fatigue [de-identified] : sacral pressure sore [FreeTextEntry8] : urinary frequency [FreeTextEntry9] : diffuse joints pain [FreeTextEntry1] : hair loss scalp region

## 2020-08-05 NOTE — PHYSICAL EXAM
[No Acute Distress] : no acute distress [Normal Oropharynx] : normal oropharynx [No Neck Mass] : no neck mass [Normal Appearance] : normal appearance [Normal Rate/Rhythm] : normal rate/rhythm [No Murmurs] : no murmurs [Normal S1, S2] : normal s1, s2 [No Resp Distress] : no resp distress [No Abnormalities] : no abnormalities [Benign] : benign [Normal Gait] : normal gait [No Clubbing] : no clubbing [No Cyanosis] : no cyanosis [No Edema] : no edema [FROM] : FROM [Normal Color/ Pigmentation] : normal color/ pigmentation [No Focal Deficits] : no focal deficits [Oriented x3] : oriented x3 [TextBox_68] : scattered wheezing [Normal Affect] : normal affect

## 2020-08-05 NOTE — ASSESSMENT
[FreeTextEntry1] : 50 year old female admitted to [facility] with COVID respiratory failure, course complicated with GI bleed, anxiety, orthostatic hypotension, anemia and UTI.  She was discharged home on 6/15/2020. \par Anemia: continue follow up with hematology and GI. \par Shortness of breath, fatigue: O2 saturation normal in clinic on room air. FOllow up with pulmonologist. \par H/o RA , multiple joints pain: follow up with rheumatology. \par Anxiety: continue current meds, Follow up with psychiatry. \par Tachycardia: follow up with cardiology. \par Ordered Urinalysis for urinary frequency, hallucination. \par Follow up with wound care clinic. \par Continue to take omeprazole for GI reflux\par Educated patient on orthostatic hypotension, TEDs prescription is given for bilateral lower extremities. \par Follow up in clinic in 1-2 months.

## 2020-08-05 NOTE — REASON FOR VISIT
[Follow-Up] : a follow-up visit [TWNoteComboBox1] : Mongolian [FreeTextEntry1] : [id number] [FreeTextEntry2] : Ángel

## 2020-08-05 NOTE — REASON FOR VISIT
[Follow-Up - From Hospitalization] : a follow-up visit after a recent hospitalization [TextBox_44] : COVID pneumonia

## 2020-08-05 NOTE — DISCUSSION/SUMMARY
[FreeTextEntry1] : This patient has a post COVID pneumonitis along with multiple multi system complaints including arthralgias and myalgias.  She is slowly improving but still has difficulty with breathing and with multiple myalgias.

## 2020-08-05 NOTE — REVIEW OF SYSTEMS
[Fatigue] : fatigue [Nasal Congestion] : nasal congestion [Cough] : cough [Postnasal Drip] : postnasal drip [Hemoptysis] : no hemoptysis [Chest Tightness] : no chest tightness [Frequent URIs] : no frequent URIs [Pleuritic Pain] : no pleuritic pain [Sputum] : no sputum [Dyspnea] : dyspnea [Abdominal Pain] : abdominal pain [Wheezing] : wheezing [Arthralgias] : arthralgias [Myalgias] : myalgias [Headache] : no headache [Focal Weakness] : no focal weakness [Dizziness] : no dizziness [Numbness] : no numbness [Paralysis] : no paralysis [Anxiety] : anxiety [Negative] : Endocrine

## 2020-08-05 NOTE — HISTORY OF PRESENT ILLNESS
[FreeTextEntry1] : 50 year old female admitted to [facility] with COVID respiratory failure, course complicated with GI bleed, anxiety, orthostatic hypotension, anemia and UTI.  She was discharged home on 6/15/2020. \par Function: She reports independent with ADL and ambulation, doesn't use DME but has multiple joints pain, fatigue and very low energy. She feels 'heaviness in all over body' and not able to move longer time/distances. NO focal weakness. She reports feeling very tired and heavy in whole body after walking 1 block.  she reports neuropathic pain and gabapentin is helping it. \par Anemia: weekly iron injection. She is following up with Dr. Junior (hematologist) in [facility]. Her Hb was 9 as per patient, was checked 2 weeks ago. \par Orthostatic hypotension: Improved while in rehab after getting blood transfusion. She reports feeling dizzy and lightheaded when getting upright from sitting position. SBP is 89 in clinic today here. \par She reports pain in all joints, got tested recently and had high Rheumatoid factor. She is following up with Dr. Yessy Miller in [place]. \par She was seen by rheumatologist a year ago due to pain in IP joints and impaired movements of hands. she was diagnosed with RA, didn't take any medicine at that time. \par she has appointment with neurologist, cardiologist, pulmonologist and GI coming few days. She hasn't seen rheumatologist recently after discharge from [facility]. \par Anxiety: continues to have anxiety. not able to sleep well at night due to anxiety and palpitation. \par She reports heartburn and gas after eating. She continues to take lexapro. She has not been following with psychiatry. She has requested psychotherapy, pending schedule. \par She reports hallucinations. she used to  have them when she was in ICU with COVID and were resolved. Now reports recurrence of hallucinations, started around 2 weeks ago. She wakes up scared and with palpitation. She denies any history of sleep apnea. She reports urinary frequency at night, ongoing for last month. \par Skin: Pressure ulcer stage 4 when she was in rehab, now has the same wound on right inner gluteal region ongoing, also has two new stage 2 in right and mid sacral region. She was not being followed by wound care team, now has appointment to see wound care. \par

## 2020-08-05 NOTE — PHYSICAL EXAM
[FreeTextEntry1] : Constitutional - NAD, Comfortable, tired and slow in movements. \par HEENT - NCAT, EOMI\par Neck - Supple\par Chest - CTA bilaterally\par Cardiovascular - RRR, S1S2\par Abdomen - BS+, Soft, NTND\par Extremities - No C/C/E, No calf tenderness \par Neurologic Exam -               \par    Cognitive - Awake, Alert, Oriented to self, place, date, year, situation\par    Communication - Fluent, No dysarthria\par    Attention - able to recite months of the year backward\par     \par    Cranial Nerves - CN 2-12 grossly intact\par    Sensory - Intact to light touch diffusely\par    Vestibulo-ocular - No nystagmus, no saccades\par    Reflexes - DTR intact and symmetrical, Negative Boyd's bilaterally, Negative Babinski's bilaterally \par    Coordination - Finger-to-nose intact bilaterally \par Psychiatric - Affect WNL\par Skin - stage 4 in right mid gluteal region, another two stage 2 sores in left gluteal and sacral region. \par  [4] : S1 ankle flexors 4/5

## 2020-08-13 ENCOUNTER — APPOINTMENT (OUTPATIENT)
Dept: CARDIOLOGY | Facility: CLINIC | Age: 50
End: 2020-08-13

## 2020-09-14 ENCOUNTER — APPOINTMENT (OUTPATIENT)
Dept: PHYSICAL MEDICINE AND REHAB | Facility: CLINIC | Age: 50
End: 2020-09-14
Payer: MEDICAID

## 2020-09-14 VITALS
DIASTOLIC BLOOD PRESSURE: 60 MMHG | HEIGHT: 64 IN | HEART RATE: 96 BPM | RESPIRATION RATE: 18 BRPM | OXYGEN SATURATION: 96 % | TEMPERATURE: 98.2 F | SYSTOLIC BLOOD PRESSURE: 100 MMHG | WEIGHT: 132 LBS | BODY MASS INDEX: 22.53 KG/M2

## 2020-09-14 DIAGNOSIS — I95.1 ORTHOSTATIC HYPOTENSION: ICD-10-CM

## 2020-09-14 DIAGNOSIS — U07.1 COVID-19: ICD-10-CM

## 2020-09-14 DIAGNOSIS — F41.9 ANXIETY DISORDER, UNSPECIFIED: ICD-10-CM

## 2020-09-14 DIAGNOSIS — R35.0 FREQUENCY OF MICTURITION: ICD-10-CM

## 2020-09-14 DIAGNOSIS — L89.94 PRESSURE ULCER OF UNSPECIFIED SITE, STAGE 4: ICD-10-CM

## 2020-09-14 DIAGNOSIS — R53.83 OTHER FATIGUE: ICD-10-CM

## 2020-09-14 DIAGNOSIS — D64.9 ANEMIA, UNSPECIFIED: ICD-10-CM

## 2020-09-14 DIAGNOSIS — R00.0 TACHYCARDIA, UNSPECIFIED: ICD-10-CM

## 2020-09-14 PROCEDURE — 99215 OFFICE O/P EST HI 40 MIN: CPT

## 2020-09-17 ENCOUNTER — APPOINTMENT (OUTPATIENT)
Dept: PULMONOLOGY | Facility: CLINIC | Age: 50
End: 2020-09-17
Payer: MEDICAID

## 2020-09-17 VITALS
DIASTOLIC BLOOD PRESSURE: 62 MMHG | HEART RATE: 99 BPM | HEIGHT: 64 IN | RESPIRATION RATE: 18 BRPM | SYSTOLIC BLOOD PRESSURE: 100 MMHG | OXYGEN SATURATION: 98 % | TEMPERATURE: 97.2 F

## 2020-09-17 PROBLEM — R00.0 TACHYCARDIA: Status: ACTIVE | Noted: 2020-07-15

## 2020-09-17 PROBLEM — I95.1 PRIMARY ORTHOSTATIC HYPOTENSION: Status: ACTIVE | Noted: 2020-08-03

## 2020-09-17 PROBLEM — F41.9 ANXIETY: Status: ACTIVE | Noted: 2020-08-05

## 2020-09-17 PROBLEM — L89.94 STAGE 4 DECUBITUS ULCER: Status: ACTIVE | Noted: 2020-07-15

## 2020-09-17 PROBLEM — R35.0 URINARY FREQUENCY: Status: ACTIVE | Noted: 2020-08-03

## 2020-09-17 PROBLEM — U07.1 COVID-19: Status: ACTIVE | Noted: 2020-09-17

## 2020-09-17 PROBLEM — D64.9 ANEMIA: Status: ACTIVE | Noted: 2020-07-15

## 2020-09-17 PROCEDURE — 99213 OFFICE O/P EST LOW 20 MIN: CPT

## 2020-09-17 NOTE — REVIEW OF SYSTEMS
[Fatigue] : fatigue [Nasal Congestion] : nasal congestion [Postnasal Drip] : postnasal drip [Dyspnea] : dyspnea [Abdominal Pain] : abdominal pain [Arthralgias] : arthralgias [Myalgias] : myalgias [Anxiety] : anxiety [Negative] : Endocrine [Cough] : no cough [Hemoptysis] : no hemoptysis [Chest Tightness] : no chest tightness [Frequent URIs] : no frequent URIs [Sputum] : no sputum [Pleuritic Pain] : no pleuritic pain [Wheezing] : no wheezing [Headache] : no headache [Focal Weakness] : no focal weakness [Dizziness] : no dizziness [Numbness] : no numbness [Paralysis] : no paralysis

## 2020-09-17 NOTE — HISTORY OF PRESENT ILLNESS
[FreeTextEntry1] : 50 year old female admitted to [facility] with COVID respiratory failure, course complicated with GI bleed, anxiety, orthostatic hypotension, anemia and UTI. She was discharged home on 6/15/2020. \par She was last seen in clinic on 8/3/2020. \par Her SOB continues, she is follow up with pulmonary team.  \par CT scan 2020: CT C spine: straightening of cervical lordosis. Right sided disc herniation at C5-C6 with right sided foraminal narrowing. FRight sided foraminal narrowing at C3-C4 and C4-C5 secondary to uncovertebral spur formation. Paranchymal opacity in the visulizaed portion of the right upper lobe with chronic changes. \par Last Hb 8/10/20 was 11.3. \par Pressure sore: closed but she feels pain when she sits down. Last UA 8.10/2020 was worse. \par Constipation after iron transfusion, using prune juice and fiber which is working for her. \par GI Dr. Skinner wants to do endoscopy due to her c/o GI upset and gas. Pending cardiology clearance. H.pylori test was negative as per patient. \par She is following up with neurology for heaviness in head region, and impaired memory. Neurology work up in progress. She is on multivitamines, omega mix, probiotic, vitamin C, liposomal glutathione, B12, folate suggested by neurology. \par Sleep: she has trouble sleeping, reports anxiety being around people since went through COVID recently and feels very scared of being around people. She is following up psychotherapy every Wednesday. \par She is not doing outpatient physical therapy as insurance from job . She didn't finish outpaient thearpy since insurance was . Now she has different insurance. She reports hard time lifting up her shoulders beyond 90 degrees, pain in bilateral upper back and neck region. She finished short term steroid taper which made her feel better for while but now again started to have pain in proximal arms and thighs. No new falls, no new weakness, no recent unexpected weight changes.

## 2020-09-17 NOTE — ASSESSMENT
[FreeTextEntry1] : 50 year old female admitted to [facility] with COVID respiratory failure, course complicated with GI bleed, anxiety, orthostatic hypotension, anemia and UTI. She was discharged home on 6/15/2020. \par Anemia: continue follow up with hematology and GI.\par Referred to outpatient therapy, gait, ambulation, endurance, balance, transfers\par Follow up with rheumatology, cardiology, hemonc, neurology, GI, primary care. \par referred to plastic surgery eval of healed sacral wound, tenderness in the area. \par Follow up in 1-2 months.

## 2020-09-17 NOTE — REVIEW OF SYSTEMS
[Negative] : Psychiatric [FreeTextEntry2] : fatigue [FreeTextEntry5] : anemia [FreeTextEntry9] : body aches [de-identified] : anxiety

## 2020-09-17 NOTE — PHYSICAL EXAM
[FreeTextEntry1] : \par Constitutional - NAD, Comfortable, tired and slow in movements. \par HEENT - NCAT, EOMI\par Neck - Supple\par Chest - CTA bilaterally\par Cardiovascular - RRR, S1S2\par Abdomen - BS+, Soft, NTND\par Extremities - No C/C/E, No calf tenderness \par Active shoulder abduction: 90\par gait: slow, stooped, narrrow steps, gets out of chair slowly\par Neurologic Exam - \par  Cognitive - Awake, Alert, Oriented to self, place, date, year, situation\par  Communication - Fluent, No dysarthria\par  Attention - able to recite months of the year backward\par  \par  Cranial Nerves - CN 2-12 grossly intact\par  Sensory - Intact to light touch diffusely\par  Vestibulo-ocular - No nystagmus, no saccades\par  Reflexes - DTR intact and symmetrical, Negative Boyd's bilaterally, Negative Babinski's bilaterally \par  Coordination - Finger-to-nose intact bilaterally \par Psychiatric - Affect WNL\par Skin -sacral ulcer healed, tender in area, no palpable collection. \par Right: C5 elbow flexion 4/5, C6 wrist extensors 4/5, C7 elbow extension 4/5, C8 middle finger extension 4/5, T1 small finger abduction 4/5, L2 hip flexion 4/5, L3 knee extension 4/5, L4 ankle dorsiflexors 4/5, L5 great toe extension 4/5, S1 ankle flexors 4/5. \par Left: C5 elbow flexion 4/5, C6 wrist extensors 4/5, C7 elbow extension 4/5, C8 middle finger extension 4/5, T1 small finger abduction 4/5, L2 hip flexion 4/5, L3 knee extension 4/5, L4 ankle dorsiflexors 4/5, L5 great toe extension 4/5, S1 ankle flexors 4/5\par \par

## 2020-09-17 NOTE — PHYSICAL EXAM
[No Acute Distress] : no acute distress [Normal Oropharynx] : normal oropharynx [Normal Appearance] : normal appearance [No Neck Mass] : no neck mass [Normal Rate/Rhythm] : normal rate/rhythm [Normal S1, S2] : normal s1, s2 [No Murmurs] : no murmurs [No Resp Distress] : no resp distress [No Abnormalities] : no abnormalities [No Clubbing] : no clubbing [No Cyanosis] : no cyanosis [No Edema] : no edema [FROM] : FROM [Normal Color/ Pigmentation] : normal color/ pigmentation [No Focal Deficits] : no focal deficits [Oriented x3] : oriented x3 [Normal Affect] : normal affect [TextBox_68] : scattered wheezing [TextBox_99] : Some scoliosis

## 2020-10-15 ENCOUNTER — RESULT REVIEW (OUTPATIENT)
Age: 50
End: 2020-10-15

## 2020-11-12 ENCOUNTER — NON-APPOINTMENT (OUTPATIENT)
Age: 50
End: 2020-11-12

## 2020-12-02 ENCOUNTER — APPOINTMENT (OUTPATIENT)
Dept: PULMONOLOGY | Facility: CLINIC | Age: 50
End: 2020-12-02

## 2020-12-28 ENCOUNTER — APPOINTMENT (OUTPATIENT)
Dept: PULMONOLOGY | Facility: CLINIC | Age: 50
End: 2020-12-28
Payer: MEDICAID

## 2020-12-28 VITALS
SYSTOLIC BLOOD PRESSURE: 106 MMHG | HEIGHT: 64 IN | HEART RATE: 82 BPM | OXYGEN SATURATION: 99 % | DIASTOLIC BLOOD PRESSURE: 70 MMHG | WEIGHT: 151 LBS | RESPIRATION RATE: 16 BRPM | TEMPERATURE: 97.3 F | BODY MASS INDEX: 25.78 KG/M2

## 2020-12-28 DIAGNOSIS — M54.9 DORSALGIA, UNSPECIFIED: ICD-10-CM

## 2020-12-28 PROCEDURE — 99214 OFFICE O/P EST MOD 30 MIN: CPT

## 2020-12-28 PROCEDURE — 99072 ADDL SUPL MATRL&STAF TM PHE: CPT

## 2020-12-28 NOTE — REVIEW OF SYSTEMS
[Fatigue] : fatigue [Nasal Congestion] : no nasal congestion [Postnasal Drip] : no postnasal drip [Cough] : no cough [Hemoptysis] : no hemoptysis [Chest Tightness] : no chest tightness [Frequent URIs] : no frequent URIs [Sputum] : no sputum [Dyspnea] : dyspnea [Pleuritic Pain] : no pleuritic pain [Wheezing] : no wheezing [Abdominal Pain] : abdominal pain [Arthralgias] : arthralgias [Myalgias] : myalgias [Headache] : no headache [Focal Weakness] : no focal weakness [Dizziness] : no dizziness [Numbness] : no numbness [Paralysis] : no paralysis [Anxiety] : anxiety [Negative] : Endocrine [TextBox_3] : Atypical back pain

## 2020-12-28 NOTE — ASSESSMENT
[FreeTextEntry1] : We discussed that her CAT scan changes are consistent with Covid related pneumonitis.  Patient has been informed that it is unclear as to if this will ever get better or not.  Currently patient has no pulmonary symptomatology.  Her back pain may be related to her recently diagnosed gastroesophageal reflux disease.  She has been started on proton pump inhibitors.

## 2021-02-16 ENCOUNTER — APPOINTMENT (OUTPATIENT)
Dept: PHYSICAL MEDICINE AND REHAB | Facility: CLINIC | Age: 51
End: 2021-02-16

## 2021-03-17 ENCOUNTER — APPOINTMENT (OUTPATIENT)
Dept: CT IMAGING | Facility: HOSPITAL | Age: 51
End: 2021-03-17

## 2021-03-25 ENCOUNTER — APPOINTMENT (OUTPATIENT)
Dept: PULMONOLOGY | Facility: CLINIC | Age: 51
End: 2021-03-25
Payer: MEDICAID

## 2021-03-25 VITALS
TEMPERATURE: 97.3 F | SYSTOLIC BLOOD PRESSURE: 108 MMHG | HEART RATE: 82 BPM | RESPIRATION RATE: 16 BRPM | DIASTOLIC BLOOD PRESSURE: 72 MMHG | BODY MASS INDEX: 25.78 KG/M2 | OXYGEN SATURATION: 99 % | WEIGHT: 151 LBS | HEIGHT: 64 IN

## 2021-03-25 DIAGNOSIS — U07.1 COVID-19: ICD-10-CM

## 2021-03-25 PROCEDURE — 99072 ADDL SUPL MATRL&STAF TM PHE: CPT

## 2021-03-25 PROCEDURE — 99213 OFFICE O/P EST LOW 20 MIN: CPT

## 2021-03-25 RX ORDER — MONTELUKAST 10 MG/1
10 TABLET, FILM COATED ORAL
Qty: 1 | Refills: 2 | Status: ACTIVE | COMMUNITY
Start: 2020-08-05 | End: 1900-01-01

## 2021-03-25 RX ORDER — PREDNISONE 20 MG/1
20 TABLET ORAL
Qty: 5 | Refills: 0 | Status: ACTIVE | COMMUNITY
Start: 2021-03-25 | End: 1900-01-01

## 2021-03-25 NOTE — ASSESSMENT
[FreeTextEntry1] : Patient most likely has long-term effects from Covid pneumonia.  This was discussed with patient.  She is aware that there may not be further progress from this point on.  We will do steroid trial along with restarting Singulair

## 2021-06-02 NOTE — PROGRESS NOTE ADULT - SUBJECTIVE AND OBJECTIVE BOX
Despite some risk factors, the patient does not demonstrate definitive evidence of glaucoma at this time. This is a 50 year old woman with no significant PMH who had recent admission for COVID respiratory failure s/p intubation and trach to vent, was on ventilator for 30 days and discharged to [facility] on 5/11/20 and then went home after one day of [facility] on Eliquis 5mg bid. Patient then presented to [facility] on 5/25/20 with chest tightness and left arm numbness, fevers, nausea, abdominal discomfort, and urinary discomfort. Patient had chest tightness, tachycardia, elevated troponin likely secondary to pleurisy from recent COVID infection. ACS, myocarditis/pericarditis unlikely, seen by Cardiology. Pulmonary embolus ruled out with CT chest. Patient also with stage 4 buttocks pressure injury. Gi consulted for dysphagia/pain with swallowing that GI attributed to recent decannulation, placed on puree/soft diet. Tachycardia during admission attributed to possible poor fluid intake. Pleuritic chest pain noted to improve. Completed course of ceftriaxone for UTI. Tested COVID negative on 5/25.     Wound care follow up visit today.     ROS: Patient states that she is doing well. No concerns or complaints this morning. Denies Pain, CP, HA, nausea, vomiting, abdominal pain. Has improved mobility and is eating and drinking well. Getting ready for d/c, daughter to come in this afternoon for functional and wound care training.                    MEDICATIONS  (STANDING):  ammonium lactate 12% Lotion 1 Application(s) Topical two times a day  ascorbic acid 500 milliGRAM(s) Oral two times a day  atorvastatin 20 milliGRAM(s) Oral at bedtime  calcium carbonate   1250 mG (OsCal) 1 Tablet(s) Oral daily  escitalopram 10 milliGRAM(s) Oral at bedtime  ferrous    sulfate 325 milliGRAM(s) Oral daily  gabapentin 100 milliGRAM(s) Oral at bedtime  heparin   Injectable 5000 Unit(s) SubCutaneous every 12 hours  multivitamin 1 Tablet(s) Oral daily  pantoprazole    Tablet 40 milliGRAM(s) Oral two times a day  polyethylene glycol 3350 17 Gram(s) Oral daily  sodium chloride 0.9%. 500 milliLiter(s) (500 mL/Hr) IV Continuous <Continuous>    MEDICATIONS  (PRN):  acetaminophen   Tablet .. 650 milliGRAM(s) Oral every 6 hours PRN Temp greater or equal to 38C (100.4F), Mild Pain (1 - 3)  ALPRAZolam 0.25 milliGRAM(s) Oral every 6 hours PRN Panic attack  aluminum hydroxide/magnesium hydroxide/simethicone Suspension 30 milliLiter(s) Oral every 6 hours PRN Dyspepsia  artificial  tears Solution 1 Drop(s) Both EYES every 2 hours PRN Dry Eyes  guaifenesin/dextromethorphan  Syrup 10 milliLiter(s) Oral every 4 hours PRN Cough  senna 2 Tablet(s) Oral at bedtime PRN Constipation  simethicone 80 milliGRAM(s) Chew three times a day PRN Gas    Physical Exam:     Vital Signs Last 24 Hrs  T(C): 36.9 (18 Jun 2020 09:10), Max: 37.1 (17 Jun 2020 20:15)  T(F): 98.4 (18 Jun 2020 09:10), Max: 98.7 (17 Jun 2020 20:15)  HR: 110 (18 Jun 2020 09:10) (97 - 110)  BP: 106/72 (18 Jun 2020 09:10) (94/59 - 106/72)  RR: 17 (18 Jun 2020 09:10) (16 - 17)  SpO2: 98% (18 Jun 2020 09:10) (98% - 99%)    Constitutional - NAD, Comfortable  	Neck - Supple, No limited ROM  	Chest - Breathing comfortably on room air   	Cardiovascular - radial pulste 2+, regular rate  	Abdomen - Soft  	Extremities - No C/C/E, No calf tenderness  	Neurologic Exam -                 	   Cognitive - Awake, Alert, AAOx4  	   Motor - No focal deficits, excellent bed mobility   	Psychiatric - Mood stable, Affect WNL  	Skin - there is a full thickness wound to the sacral wound measuring 4x2cm with undermining from 12 to 3. Depth of about 1.25cm. Decrease in all dimensions since last exam. Healthy granulation tissue filling wound. No slough noted at this time.  No palpable bone. Periwound intact. No erythema. No odor noted.   Healed wound to the left buttock.

## 2021-11-18 ENCOUNTER — APPOINTMENT (OUTPATIENT)
Dept: PULMONOLOGY | Facility: CLINIC | Age: 51
End: 2021-11-18
Payer: MEDICAID

## 2021-11-18 VITALS
DIASTOLIC BLOOD PRESSURE: 70 MMHG | HEART RATE: 80 BPM | SYSTOLIC BLOOD PRESSURE: 110 MMHG | TEMPERATURE: 98 F | BODY MASS INDEX: 25.78 KG/M2 | OXYGEN SATURATION: 98 % | HEIGHT: 64 IN | WEIGHT: 151 LBS

## 2021-11-18 DIAGNOSIS — R93.89 ABNORMAL FINDINGS ON DIAGNOSTIC IMAGING OF OTHER SPECIFIED BODY STRUCTURES: ICD-10-CM

## 2021-11-18 DIAGNOSIS — U09.9 POST COVID-19 CONDITION, UNSPECIFIED: ICD-10-CM

## 2021-11-18 PROCEDURE — 99214 OFFICE O/P EST MOD 30 MIN: CPT

## 2021-11-18 RX ORDER — BUDESONIDE AND FORMOTEROL FUMARATE DIHYDRATE 80; 4.5 UG/1; UG/1
80-4.5 AEROSOL RESPIRATORY (INHALATION) TWICE DAILY
Qty: 1 | Refills: 0 | Status: ACTIVE | COMMUNITY
Start: 2021-11-18 | End: 1900-01-01

## 2021-11-18 NOTE — REVIEW OF SYSTEMS
[Fatigue] : fatigue [Nasal Congestion] : no nasal congestion [Postnasal Drip] : no postnasal drip [Cough] : no cough [Hemoptysis] : no hemoptysis [Chest Tightness] : no chest tightness [Frequent URIs] : no frequent URIs [Sputum] : no sputum [Dyspnea] : no dyspnea [Pleuritic Pain] : no pleuritic pain [Wheezing] : no wheezing [SOB on Exertion] : sob on exertion [Abdominal Pain] : no abdominal pain [Arthralgias] : arthralgias [Myalgias] : myalgias [Headache] : no headache [Focal Weakness] : no focal weakness [Dizziness] : no dizziness [Numbness] : no numbness [Paralysis] : no paralysis [Anxiety] : anxiety [Negative] : Endocrine [TextBox_3] : Atypical back pain

## 2021-11-18 NOTE — ASSESSMENT
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  At this point in time it is unclear as to how this disease will progress in the future.  Patient has been advised of this.  Trial of bronchodilators with inhaled corticosteroids to be done.

## 2022-04-16 NOTE — DISCHARGE NOTE PROVIDER - EXTENDED VTE YES NO FOR MLM ASPIRIN
If you are a smoker, it is important for your health to stop smoking. Please be aware that second hand smoke is also harmful. ,